# Patient Record
Sex: MALE | Race: BLACK OR AFRICAN AMERICAN | Employment: UNEMPLOYED | URBAN - METROPOLITAN AREA
[De-identification: names, ages, dates, MRNs, and addresses within clinical notes are randomized per-mention and may not be internally consistent; named-entity substitution may affect disease eponyms.]

---

## 2019-01-01 ENCOUNTER — OFFICE VISIT (OUTPATIENT)
Dept: FAMILY MEDICINE CLINIC | Age: 49
End: 2019-01-01

## 2019-01-01 VITALS
OXYGEN SATURATION: 93 % | WEIGHT: 206 LBS | HEIGHT: 66 IN | RESPIRATION RATE: 24 BRPM | DIASTOLIC BLOOD PRESSURE: 57 MMHG | TEMPERATURE: 98.9 F | SYSTOLIC BLOOD PRESSURE: 89 MMHG | BODY MASS INDEX: 33.11 KG/M2 | HEART RATE: 101 BPM

## 2019-01-01 VITALS
HEART RATE: 98 BPM | WEIGHT: 202.4 LBS | SYSTOLIC BLOOD PRESSURE: 154 MMHG | HEIGHT: 66 IN | DIASTOLIC BLOOD PRESSURE: 98 MMHG | BODY MASS INDEX: 32.53 KG/M2 | TEMPERATURE: 97.6 F | RESPIRATION RATE: 40 BRPM

## 2019-01-01 DIAGNOSIS — Z79.4 TYPE 2 DIABETES MELLITUS WITH HYPERGLYCEMIA, WITH LONG-TERM CURRENT USE OF INSULIN (HCC): ICD-10-CM

## 2019-01-01 DIAGNOSIS — B18.2 HEP C W/O COMA, CHRONIC (HCC): ICD-10-CM

## 2019-01-01 DIAGNOSIS — K70.30 ALCOHOLIC CIRRHOSIS OF LIVER WITHOUT ASCITES (HCC): ICD-10-CM

## 2019-01-01 DIAGNOSIS — R10.30 LOWER ABDOMINAL PAIN: ICD-10-CM

## 2019-01-01 DIAGNOSIS — R06.82 TACHYPNEA: ICD-10-CM

## 2019-01-01 DIAGNOSIS — I95.89 OTHER SPECIFIED HYPOTENSION: Primary | ICD-10-CM

## 2019-01-01 DIAGNOSIS — Z79.4 TYPE 2 DIABETES MELLITUS WITH HYPERGLYCEMIA, WITH LONG-TERM CURRENT USE OF INSULIN (HCC): Primary | ICD-10-CM

## 2019-01-01 DIAGNOSIS — E11.65 TYPE 2 DIABETES MELLITUS WITH HYPERGLYCEMIA, WITH LONG-TERM CURRENT USE OF INSULIN (HCC): Primary | ICD-10-CM

## 2019-01-01 DIAGNOSIS — E11.65 TYPE 2 DIABETES MELLITUS WITH HYPERGLYCEMIA, WITH LONG-TERM CURRENT USE OF INSULIN (HCC): ICD-10-CM

## 2019-01-01 DIAGNOSIS — K76.82 HEPATIC ENCEPHALOPATHY: ICD-10-CM

## 2019-01-01 LAB — GLUCOSE POC: 425 MG/DL

## 2019-01-01 RX ORDER — NADOLOL 20 MG/1
20 TABLET ORAL
COMMUNITY
Start: 2019-01-01 | End: 2020-01-01 | Stop reason: SDUPTHER

## 2019-01-01 RX ORDER — ISOSORBIDE DINITRATE 10 MG/1
20 TABLET ORAL 3 TIMES DAILY
COMMUNITY
Start: 2019-01-01 | End: 2020-01-01

## 2019-05-25 ENCOUNTER — APPOINTMENT (OUTPATIENT)
Dept: CT IMAGING | Age: 49
DRG: 280 | End: 2019-05-25
Attending: EMERGENCY MEDICINE
Payer: MEDICAID

## 2019-05-25 ENCOUNTER — HOSPITAL ENCOUNTER (INPATIENT)
Age: 49
LOS: 3 days | Discharge: PSYCHIATRIC OR SUBSTANCE USE FACILITY | DRG: 280 | End: 2019-05-29
Attending: EMERGENCY MEDICINE | Admitting: INTERNAL MEDICINE
Payer: MEDICAID

## 2019-05-25 ENCOUNTER — APPOINTMENT (OUTPATIENT)
Dept: GENERAL RADIOLOGY | Age: 49
DRG: 280 | End: 2019-05-25
Attending: EMERGENCY MEDICINE
Payer: MEDICAID

## 2019-05-25 DIAGNOSIS — F10.239 ALCOHOL DEPENDENCE WITH WITHDRAWAL WITH COMPLICATION (HCC): Chronic | ICD-10-CM

## 2019-05-25 DIAGNOSIS — B18.2 CHRONIC HEPATITIS C WITH HEPATIC COMA (HCC): ICD-10-CM

## 2019-05-25 DIAGNOSIS — K76.82 ACUTE HEPATIC ENCEPHALOPATHY: ICD-10-CM

## 2019-05-25 DIAGNOSIS — R76.8 HCV ANTIBODY POSITIVE: ICD-10-CM

## 2019-05-25 DIAGNOSIS — F10.10 ALCOHOL ABUSE: ICD-10-CM

## 2019-05-25 DIAGNOSIS — K76.82 HEPATIC ENCEPHALOPATHY: Primary | ICD-10-CM

## 2019-05-25 DIAGNOSIS — D69.6 THROMBOCYTOPENIA (HCC): ICD-10-CM

## 2019-05-25 DIAGNOSIS — F10.921 ALCOHOL INTOXICATION WITH DELIRIUM (HCC): ICD-10-CM

## 2019-05-25 DIAGNOSIS — D64.9 ANEMIA, UNSPECIFIED TYPE: ICD-10-CM

## 2019-05-25 DIAGNOSIS — F19.10 POLYSUBSTANCE ABUSE (HCC): ICD-10-CM

## 2019-05-25 DIAGNOSIS — K70.30 ALCOHOLIC CIRRHOSIS OF LIVER WITHOUT ASCITES (HCC): ICD-10-CM

## 2019-05-25 PROBLEM — R41.89 UNRESPONSIVENESS: Status: ACTIVE | Noted: 2019-05-25

## 2019-05-25 LAB
ALBUMIN SERPL-MCNC: 3 G/DL (ref 3.5–5)
ALBUMIN/GLOB SERPL: 0.6 {RATIO} (ref 1.1–2.2)
ALP SERPL-CCNC: 119 U/L (ref 45–117)
ALT SERPL-CCNC: 40 U/L (ref 12–78)
AMMONIA PLAS-SCNC: 81 UMOL/L
AMPHET UR QL SCN: NEGATIVE
ANION GAP SERPL CALC-SCNC: 9 MMOL/L (ref 5–15)
APPEARANCE UR: CLEAR
ARTERIAL PATENCY WRIST A: ABNORMAL
AST SERPL-CCNC: 36 U/L (ref 15–37)
BACTERIA URNS QL MICRO: NEGATIVE /HPF
BARBITURATES UR QL SCN: NEGATIVE
BASE DEFICIT BLDV-SCNC: 5 MMOL/L
BASOPHILS # BLD: 0 K/UL (ref 0–0.1)
BASOPHILS NFR BLD: 1 % (ref 0–1)
BDY SITE: ABNORMAL
BENZODIAZ UR QL: NEGATIVE
BILIRUB SERPL-MCNC: 0.7 MG/DL (ref 0.2–1)
BILIRUB UR QL: NEGATIVE
BUN SERPL-MCNC: 12 MG/DL (ref 6–20)
BUN/CREAT SERPL: 11 (ref 12–20)
CALCIUM SERPL-MCNC: 9.8 MG/DL (ref 8.5–10.1)
CANNABINOIDS UR QL SCN: NEGATIVE
CHLORIDE SERPL-SCNC: 107 MMOL/L (ref 97–108)
CO2 SERPL-SCNC: 24 MMOL/L (ref 21–32)
COCAINE UR QL SCN: POSITIVE
COLOR UR: ABNORMAL
COMMENT, HOLDF: NORMAL
CREAT SERPL-MCNC: 1.11 MG/DL (ref 0.7–1.3)
DIFFERENTIAL METHOD BLD: ABNORMAL
DRUG SCRN COMMENT,DRGCM: ABNORMAL
EOSINOPHIL # BLD: 0.3 K/UL (ref 0–0.4)
EOSINOPHIL NFR BLD: 6 % (ref 0–7)
EPITH CASTS URNS QL MICRO: ABNORMAL /LPF
ERYTHROCYTE [DISTWIDTH] IN BLOOD BY AUTOMATED COUNT: 17.4 % (ref 11.5–14.5)
ETHANOL SERPL-MCNC: 189 MG/DL
GAS FLOW.O2 O2 DELIVERY SYS: ABNORMAL L/MIN
GLOBULIN SER CALC-MCNC: 5.2 G/DL (ref 2–4)
GLUCOSE BLD STRIP.AUTO-MCNC: 390 MG/DL (ref 65–100)
GLUCOSE SERPL-MCNC: 385 MG/DL (ref 65–100)
GLUCOSE UR STRIP.AUTO-MCNC: >1000 MG/DL
HCO3 BLDV-SCNC: 21.4 MMOL/L (ref 23–28)
HCT VFR BLD AUTO: 35.9 % (ref 36.6–50.3)
HGB BLD-MCNC: 10.4 G/DL (ref 12.1–17)
HGB UR QL STRIP: ABNORMAL
IMM GRANULOCYTES # BLD AUTO: 0 K/UL (ref 0–0.04)
IMM GRANULOCYTES NFR BLD AUTO: 1 % (ref 0–0.5)
INR PPP: 1.2 (ref 0.9–1.1)
KETONES UR QL STRIP.AUTO: NEGATIVE MG/DL
LEUKOCYTE ESTERASE UR QL STRIP.AUTO: NEGATIVE
LIPASE SERPL-CCNC: 68 U/L (ref 73–393)
LYMPHOCYTES # BLD: 1.2 K/UL (ref 0.8–3.5)
LYMPHOCYTES NFR BLD: 22 % (ref 12–49)
MCH RBC QN AUTO: 26.1 PG (ref 26–34)
MCHC RBC AUTO-ENTMCNC: 29 G/DL (ref 30–36.5)
MCV RBC AUTO: 90 FL (ref 80–99)
METHADONE UR QL: NEGATIVE
MONOCYTES # BLD: 0.8 K/UL (ref 0–1)
MONOCYTES NFR BLD: 15 % (ref 5–13)
NEUTS SEG # BLD: 3.2 K/UL (ref 1.8–8)
NEUTS SEG NFR BLD: 55 % (ref 32–75)
NITRITE UR QL STRIP.AUTO: NEGATIVE
NRBC # BLD: 0 K/UL (ref 0–0.01)
NRBC BLD-RTO: 0 PER 100 WBC
OPIATES UR QL: POSITIVE
PCO2 BLDV: 40.8 MMHG (ref 41–51)
PCP UR QL: NEGATIVE
PH BLDV: 7.33 [PH] (ref 7.32–7.42)
PH UR STRIP: 5 [PH] (ref 5–8)
PLATELET # BLD AUTO: 121 K/UL (ref 150–400)
PMV BLD AUTO: 9.9 FL (ref 8.9–12.9)
PO2 BLDV: 47 MMHG (ref 25–40)
POTASSIUM SERPL-SCNC: 3.6 MMOL/L (ref 3.5–5.1)
PROT SERPL-MCNC: 8.2 G/DL (ref 6.4–8.2)
PROT UR STRIP-MCNC: 30 MG/DL
PROTHROMBIN TIME: 11.6 SEC (ref 9–11.1)
RBC # BLD AUTO: 3.99 M/UL (ref 4.1–5.7)
RBC #/AREA URNS HPF: ABNORMAL /HPF (ref 0–5)
SAMPLES BEING HELD,HOLD: NORMAL
SAO2 % BLDV: 79 % (ref 65–88)
SERVICE CMNT-IMP: ABNORMAL
SODIUM SERPL-SCNC: 140 MMOL/L (ref 136–145)
SP GR UR REFRACTOMETRY: 1.02 (ref 1–1.03)
SPECIMEN TYPE: ABNORMAL
TOTAL RESP. RATE, ITRR: 27
UR CULT HOLD, URHOLD: NORMAL
UROBILINOGEN UR QL STRIP.AUTO: 1 EU/DL (ref 0.2–1)
WBC # BLD AUTO: 5.7 K/UL (ref 4.1–11.1)
WBC URNS QL MICRO: ABNORMAL /HPF (ref 0–4)

## 2019-05-25 PROCEDURE — 36415 COLL VENOUS BLD VENIPUNCTURE: CPT

## 2019-05-25 PROCEDURE — 99285 EMERGENCY DEPT VISIT HI MDM: CPT

## 2019-05-25 PROCEDURE — 80053 COMPREHEN METABOLIC PANEL: CPT

## 2019-05-25 PROCEDURE — 83690 ASSAY OF LIPASE: CPT

## 2019-05-25 PROCEDURE — 82140 ASSAY OF AMMONIA: CPT

## 2019-05-25 PROCEDURE — 85025 COMPLETE CBC W/AUTO DIFF WBC: CPT

## 2019-05-25 PROCEDURE — 82962 GLUCOSE BLOOD TEST: CPT

## 2019-05-25 PROCEDURE — 83036 HEMOGLOBIN GLYCOSYLATED A1C: CPT

## 2019-05-25 PROCEDURE — 70450 CT HEAD/BRAIN W/O DYE: CPT

## 2019-05-25 PROCEDURE — 71045 X-RAY EXAM CHEST 1 VIEW: CPT

## 2019-05-25 PROCEDURE — 74011250636 HC RX REV CODE- 250/636: Performed by: EMERGENCY MEDICINE

## 2019-05-25 PROCEDURE — 82803 BLOOD GASES ANY COMBINATION: CPT

## 2019-05-25 PROCEDURE — 96360 HYDRATION IV INFUSION INIT: CPT

## 2019-05-25 PROCEDURE — 85610 PROTHROMBIN TIME: CPT

## 2019-05-25 PROCEDURE — 80307 DRUG TEST PRSMV CHEM ANLYZR: CPT

## 2019-05-25 PROCEDURE — 81001 URINALYSIS AUTO W/SCOPE: CPT

## 2019-05-25 PROCEDURE — 99218 HC RM OBSERVATION: CPT

## 2019-05-25 RX ORDER — ENOXAPARIN SODIUM 100 MG/ML
40 INJECTION SUBCUTANEOUS EVERY 24 HOURS
Status: DISCONTINUED | OUTPATIENT
Start: 2019-05-26 | End: 2019-05-28

## 2019-05-25 RX ORDER — SODIUM CHLORIDE 0.9 % (FLUSH) 0.9 %
5-40 SYRINGE (ML) INJECTION EVERY 8 HOURS
Status: DISCONTINUED | OUTPATIENT
Start: 2019-05-25 | End: 2019-05-29 | Stop reason: HOSPADM

## 2019-05-25 RX ORDER — ACETAMINOPHEN 325 MG/1
650 TABLET ORAL
Status: DISCONTINUED | OUTPATIENT
Start: 2019-05-25 | End: 2019-05-29 | Stop reason: HOSPADM

## 2019-05-25 RX ORDER — ONDANSETRON 2 MG/ML
4 INJECTION INTRAMUSCULAR; INTRAVENOUS
Status: DISCONTINUED | OUTPATIENT
Start: 2019-05-25 | End: 2019-05-29 | Stop reason: HOSPADM

## 2019-05-25 RX ORDER — SODIUM CHLORIDE 0.9 % (FLUSH) 0.9 %
5-40 SYRINGE (ML) INJECTION AS NEEDED
Status: DISCONTINUED | OUTPATIENT
Start: 2019-05-25 | End: 2019-05-29 | Stop reason: HOSPADM

## 2019-05-25 RX ADMIN — SODIUM CHLORIDE 1000 ML: 900 INJECTION, SOLUTION INTRAVENOUS at 19:18
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
## 2019-05-25 NOTE — ED NOTES
Bedside and Verbal shift change report given to [address] (oncoming nurse) by Shannon Ruiz (offgoing nurse). Report included the following information SBAR, Kardex, ED Summary, [facility] and Recent Results.

## 2019-05-25 NOTE — ED TRIAGE NOTES
Patient presents from home with complaints of hyperglycemia. Patient's blood sugar en route was 393. Patient reports \"ain't nothing wrong\" . Patient denies SI and HI at this time. When asked patient reports he drank \"a little bit\" of alcohol today

## 2019-05-26 ENCOUNTER — APPOINTMENT (OUTPATIENT)
Dept: CT IMAGING | Age: 49
DRG: 280 | End: 2019-05-26
Attending: INTERNAL MEDICINE
Payer: MEDICAID

## 2019-05-26 PROBLEM — K76.82 HEPATIC ENCEPHALOPATHY: Status: ACTIVE | Noted: 2019-05-26

## 2019-05-26 PROBLEM — K76.82 ACUTE HEPATIC ENCEPHALOPATHY: Status: ACTIVE | Noted: 2019-05-26

## 2019-05-26 PROBLEM — K70.30 LIVER CIRRHOSIS, ALCOHOLIC (HCC): Status: ACTIVE | Noted: 2019-05-26

## 2019-05-26 PROBLEM — D69.6 THROMBOCYTOPENIA (HCC): Status: ACTIVE | Noted: 2019-05-26

## 2019-05-26 PROBLEM — E72.20 HYPERAMMONEMIA (HCC): Status: ACTIVE | Noted: 2019-05-26

## 2019-05-26 LAB
EST. AVERAGE GLUCOSE BLD GHB EST-MCNC: 269 MG/DL
GLUCOSE BLD STRIP.AUTO-MCNC: 250 MG/DL (ref 65–100)
GLUCOSE BLD STRIP.AUTO-MCNC: 284 MG/DL (ref 65–100)
GLUCOSE BLD STRIP.AUTO-MCNC: 296 MG/DL (ref 65–100)
GLUCOSE BLD STRIP.AUTO-MCNC: 322 MG/DL (ref 65–100)
GLUCOSE BLD STRIP.AUTO-MCNC: 338 MG/DL (ref 65–100)
HBA1C MFR BLD: 11 % (ref 4.2–6.3)
SERVICE CMNT-IMP: ABNORMAL

## 2019-05-26 PROCEDURE — 74011250636 HC RX REV CODE- 250/636: Performed by: INTERNAL MEDICINE

## 2019-05-26 PROCEDURE — 82962 GLUCOSE BLOOD TEST: CPT

## 2019-05-26 PROCEDURE — 74011250636 HC RX REV CODE- 250/636: Performed by: EMERGENCY MEDICINE

## 2019-05-26 PROCEDURE — 65660000001 HC RM ICU INTERMED STEPDOWN

## 2019-05-26 PROCEDURE — 74011636637 HC RX REV CODE- 636/637: Performed by: INTERNAL MEDICINE

## 2019-05-26 PROCEDURE — 99218 HC RM OBSERVATION: CPT

## 2019-05-26 PROCEDURE — 74011250637 HC RX REV CODE- 250/637: Performed by: INTERNAL MEDICINE

## 2019-05-26 PROCEDURE — 96361 HYDRATE IV INFUSION ADD-ON: CPT

## 2019-05-26 PROCEDURE — 74011636320 HC RX REV CODE- 636/320: Performed by: RADIOLOGY

## 2019-05-26 PROCEDURE — 74177 CT ABD & PELVIS W/CONTRAST: CPT

## 2019-05-26 PROCEDURE — 74011000250 HC RX REV CODE- 250: Performed by: INTERNAL MEDICINE

## 2019-05-26 PROCEDURE — 74011000258 HC RX REV CODE- 258: Performed by: RADIOLOGY

## 2019-05-26 RX ORDER — SODIUM CHLORIDE 0.9 % (FLUSH) 0.9 %
10 SYRINGE (ML) INJECTION
Status: DISPENSED | OUTPATIENT
Start: 2019-05-26 | End: 2019-05-26

## 2019-05-26 RX ORDER — CLONIDINE HYDROCHLORIDE 0.3 MG/1
0.3 TABLET ORAL 2 TIMES DAILY
Status: ON HOLD | COMMUNITY
End: 2019-06-03 | Stop reason: SDUPTHER

## 2019-05-26 RX ORDER — LORAZEPAM 2 MG/ML
4 INJECTION INTRAMUSCULAR
Status: DISCONTINUED | OUTPATIENT
Start: 2019-05-26 | End: 2019-05-29 | Stop reason: HOSPADM

## 2019-05-26 RX ORDER — CLONIDINE 0.1 MG/24H
1 PATCH, EXTENDED RELEASE TRANSDERMAL ONCE
Status: DISCONTINUED | OUTPATIENT
Start: 2019-05-26 | End: 2019-05-29 | Stop reason: HOSPADM

## 2019-05-26 RX ORDER — DEXTROSE 50 % IN WATER (D50W) INTRAVENOUS SYRINGE
25-50 AS NEEDED
Status: DISCONTINUED | OUTPATIENT
Start: 2019-05-26 | End: 2019-05-29 | Stop reason: HOSPADM

## 2019-05-26 RX ORDER — INSULIN LISPRO 100 [IU]/ML
INJECTION, SOLUTION INTRAVENOUS; SUBCUTANEOUS
Status: DISCONTINUED | OUTPATIENT
Start: 2019-05-26 | End: 2019-05-28

## 2019-05-26 RX ORDER — LORAZEPAM 2 MG/ML
2 INJECTION INTRAMUSCULAR
Status: DISCONTINUED | OUTPATIENT
Start: 2019-05-26 | End: 2019-05-29 | Stop reason: HOSPADM

## 2019-05-26 RX ORDER — LORAZEPAM 0.5 MG/1
0.5 TABLET ORAL EVERY 4 HOURS
Status: DISCONTINUED | OUTPATIENT
Start: 2019-05-26 | End: 2019-05-26

## 2019-05-26 RX ORDER — MAGNESIUM SULFATE 100 %
4 CRYSTALS MISCELLANEOUS AS NEEDED
Status: DISCONTINUED | OUTPATIENT
Start: 2019-05-26 | End: 2019-05-29 | Stop reason: HOSPADM

## 2019-05-26 RX ORDER — HYDRALAZINE HYDROCHLORIDE 20 MG/ML
20 INJECTION INTRAMUSCULAR; INTRAVENOUS
Status: DISCONTINUED | OUTPATIENT
Start: 2019-05-26 | End: 2019-05-29 | Stop reason: HOSPADM

## 2019-05-26 RX ORDER — SODIUM CHLORIDE 0.9 % (FLUSH) 0.9 %
10 SYRINGE (ML) INJECTION
Status: COMPLETED | OUTPATIENT
Start: 2019-05-26 | End: 2019-05-26

## 2019-05-26 RX ORDER — HYDRALAZINE HYDROCHLORIDE 20 MG/ML
20 INJECTION INTRAMUSCULAR; INTRAVENOUS
Status: DISCONTINUED | OUTPATIENT
Start: 2019-05-26 | End: 2019-05-26

## 2019-05-26 RX ORDER — TRAMADOL HYDROCHLORIDE 50 MG/1
50 TABLET ORAL
Status: DISCONTINUED | OUTPATIENT
Start: 2019-05-26 | End: 2019-05-29

## 2019-05-26 RX ORDER — FOLIC ACID 1 MG/1
1 TABLET ORAL DAILY
Status: DISCONTINUED | OUTPATIENT
Start: 2019-05-26 | End: 2019-05-29 | Stop reason: SDUPTHER

## 2019-05-26 RX ORDER — LORAZEPAM 0.5 MG/1
0.5 TABLET ORAL EVERY 6 HOURS
Status: DISPENSED | OUTPATIENT
Start: 2019-05-27 | End: 2019-05-27

## 2019-05-26 RX ORDER — CLONIDINE HYDROCHLORIDE 0.1 MG/1
0.1 TABLET ORAL
Status: DISCONTINUED | OUTPATIENT
Start: 2019-05-26 | End: 2019-05-29 | Stop reason: HOSPADM

## 2019-05-26 RX ADMIN — HYDRALAZINE HYDROCHLORIDE 20 MG: 20 INJECTION INTRAMUSCULAR; INTRAVENOUS at 15:25

## 2019-05-26 RX ADMIN — Medication 10 ML: at 04:54

## 2019-05-26 RX ADMIN — INSULIN LISPRO 5 UNITS: 100 INJECTION, SOLUTION INTRAVENOUS; SUBCUTANEOUS at 17:13

## 2019-05-26 RX ADMIN — IOPAMIDOL 100 ML: 755 INJECTION, SOLUTION INTRAVENOUS at 08:00

## 2019-05-26 RX ADMIN — HYDRALAZINE HYDROCHLORIDE 20 MG: 20 INJECTION INTRAMUSCULAR; INTRAVENOUS at 23:48

## 2019-05-26 RX ADMIN — INSULIN LISPRO 7 UNITS: 100 INJECTION, SOLUTION INTRAVENOUS; SUBCUTANEOUS at 07:05

## 2019-05-26 RX ADMIN — SODIUM CHLORIDE 100 ML: 900 INJECTION, SOLUTION INTRAVENOUS at 08:00

## 2019-05-26 RX ADMIN — FOLIC ACID: 5 INJECTION, SOLUTION INTRAMUSCULAR; INTRAVENOUS; SUBCUTANEOUS at 09:43

## 2019-05-26 RX ADMIN — LACTULOSE 15 ML: 20 SOLUTION ORAL at 15:25

## 2019-05-26 RX ADMIN — LORAZEPAM 0.5 MG: 0.5 TABLET ORAL at 15:25

## 2019-05-26 RX ADMIN — HYDRALAZINE HYDROCHLORIDE 20 MG: 20 INJECTION INTRAMUSCULAR; INTRAVENOUS at 19:03

## 2019-05-26 RX ADMIN — LORAZEPAM 0.5 MG: 0.5 TABLET ORAL at 12:05

## 2019-05-26 RX ADMIN — LACTULOSE 15 ML: 20 SOLUTION ORAL at 07:06

## 2019-05-26 RX ADMIN — ONDANSETRON 4 MG: 2 INJECTION INTRAMUSCULAR; INTRAVENOUS at 15:31

## 2019-05-26 RX ADMIN — LORAZEPAM 4 MG: 2 INJECTION, SOLUTION INTRAMUSCULAR; INTRAVENOUS at 20:37

## 2019-05-26 RX ADMIN — Medication 10 ML: at 05:00

## 2019-05-26 RX ADMIN — FOLIC ACID 1 MG: 1 TABLET ORAL at 09:43

## 2019-05-26 RX ADMIN — CLONIDINE HYDROCHLORIDE 0.3 MG: 0.1 TABLET ORAL at 18:15

## 2019-05-26 RX ADMIN — LORAZEPAM 4 MG: 2 INJECTION, SOLUTION INTRAMUSCULAR; INTRAVENOUS at 19:31

## 2019-05-26 RX ADMIN — SODIUM CHLORIDE 1000 ML: 900 INJECTION, SOLUTION INTRAVENOUS at 00:18

## 2019-05-26 RX ADMIN — INSULIN LISPRO 3 UNITS: 100 INJECTION, SOLUTION INTRAVENOUS; SUBCUTANEOUS at 12:05

## 2019-05-26 RX ADMIN — ENOXAPARIN SODIUM 40 MG: 40 INJECTION SUBCUTANEOUS at 04:53

## 2019-05-26 RX ADMIN — LORAZEPAM 4 MG: 2 INJECTION, SOLUTION INTRAMUSCULAR; INTRAVENOUS at 18:41

## 2019-05-26 RX ADMIN — LORAZEPAM 0.5 MG: 0.5 TABLET ORAL at 04:53

## 2019-05-26 RX ADMIN — TRAMADOL HYDROCHLORIDE 50 MG: 50 TABLET, FILM COATED ORAL at 15:57

## 2019-05-26 RX ADMIN — ENOXAPARIN SODIUM 40 MG: 40 INJECTION SUBCUTANEOUS at 23:01

## 2019-05-26 RX ADMIN — Medication 10 ML: at 08:00

## 2019-05-26 RX ADMIN — LORAZEPAM 0.5 MG: 0.5 TABLET ORAL at 07:05

## 2019-05-26 RX ADMIN — LORAZEPAM 2 MG: 2 INJECTION INTRAMUSCULAR; INTRAVENOUS at 15:57

## 2019-05-26 RX ADMIN — LORAZEPAM 4 MG: 2 INJECTION, SOLUTION INTRAMUSCULAR; INTRAVENOUS at 22:27

## 2019-05-26 RX ADMIN — Medication 10 ML: at 22:00

## 2019-05-26 RX ADMIN — LORAZEPAM 2 MG: 2 INJECTION INTRAMUSCULAR; INTRAVENOUS at 17:17
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
## 2019-05-26 NOTE — PROGRESS NOTES
Bedside shift change report given to [address]Celina Tan (oncoming nurse) by Ashok Cutler (offgoing nurse). Report included the following information SBAR, Kardex and MAR.
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
## 2019-05-26 NOTE — ACP (ADVANCE CARE PLANNING)
Advance Care Planning Note Name: Alicia Zuluaga YOB: 1970 MRN: [id number] Admission Date: 5/25/2019  6:58 PM 
 
Date of discussion: 5/26/2019 Active Diagnoses: 
 
Hospital Problems  Date Reviewed: 5/26/2019 Codes Class Noted POA Hepatic encephalopathy ([facility] ICD-10-CM: K72.90 ICD-9-CM: 572.2  5/26/2019 Yes Hyperammonemia (HCC) ICD-10-CM: P16.49 ICD-9-CM: 270.6  5/26/2019 Yes Thrombocytopenia ([facility] ICD-10-CM: D69.6 ICD-9-CM: 287.5  5/26/2019 Yes Liver cirrhosis, alcoholic ([facility] EBE-86-ER: K70.30 ICD-9-CM: 571.2  5/26/2019 Yes Unresponsiveness ICD-10-CM: R41.89 ICD-9-CM: 780.09  5/25/2019 Unknown These active diagnoses are of sufficient risk that focused discussion on advance care planning is indicated in order to allow the patient to thoughtfully consider personal goals of care, and if situations arise that prevent the ability to personally give input, to ensure appropriate representation of their personal desires for different levels and aggressiveness of care. Discussion:  
 
Persons present and participating in discussion: Alicia Margareth, and Antoinette Warren MD. Discussion: Addressed decompensated medical problems, Co-morbidities, Advance directives, Prognosis and confirmation of a Surrogate Decision Maker. Time Spent:  
 
Total time spent face-to-face in education and discussion: 16 minutes.   
 
Antoinette Warren MD 
5/26/2019 
1:32 PM

## 2019-05-26 NOTE — ED NOTES
Patient has returned from CT scan at this time. Patient has shifted himself in bed and answered orientation questions before falling back to sleep. CM x 3. Call bell within reach of patient.

## 2019-05-26 NOTE — PROGRESS NOTES
Bedside shift change report given to Judy Schroeder (oncoming nurse) by Nesha Jensen (offgoing nurse). Report included the following information SBAR.

## 2019-05-26 NOTE — PROGRESS NOTES
Spoke with dr regarding pts /98 and current CIWA( 15) score. Nurse gave prescribed PRN ativan and will continue to monitor.

## 2019-05-26 NOTE — ROUTINE PROCESS
TRANSFER - OUT REPORT: 
 
Verbal report given to Andi Kee RN(name) on Екатерина Dominique  being transferred to (unit) for routine progression of care Report consisted of patients Situation, Background, Assessment and  
Recommendations(SBAR). Information from the following report(s) SBAR, ED Summary and Intake/Output was reviewed with the receiving nurse. Lines:  
Peripheral IV 05/25/19 Left Forearm (Active) Site Assessment Clean, dry, & intact 5/25/2019  7:17 PM  
Phlebitis Assessment 0 5/25/2019  7:17 PM  
Infiltration Assessment 0 5/25/2019  7:17 PM  
Dressing Status Clean, dry, & intact 5/25/2019  7:17 PM  
  
 
Opportunity for questions and clarification was provided. Patient transported with: 
 Monitor Registered Nurse

## 2019-05-26 NOTE — PROGRESS NOTES
Care Management - Met with patient in the room. He said his address is now [address] [place] [address], [place] [address]. His phone number is [phone]. Patient stated he had Medicaid. Patient stated he will use a Medicaid cab to go home. Observation notice provided in writing to patient and/or caregiver as well as verbal explanation of the policy. Patients who are in outpatient status also receive the Observation notice. Care Management Interventions Mode of Transport at Discharge: (medicaid cab) Discharge Durable Medical Equipment: No 
Physical Therapy Consult: No 
Occupational Therapy Consult: No 
Speech Therapy Consult: No 
Confirm Follow Up Transport: Self(Medicaid cab) Plan discussed with Pt/Family/Caregiver: Yes [employer] Information Provided?: No 
Discharge Location Discharge Placement: Home Confirmed via Medicaid Portal that patient has Medicaid Expansion. His number is [id number].  
 
IRASEMA Morgan

## 2019-05-26 NOTE — PROGRESS NOTES
TRANSFER - OUT REPORT: 
 
Verbal report given to Davian Brand (name) on Екатерина Dominique  being transferred to [facility] (unit) for change in patient condition(increased confusion, high MEWS, and High CIWA) Report consisted of patients Situation, Background, Assessment and  
Recommendations(SBAR). Information from the following report(s) SBAR, Kardex and Intake/Output was reviewed with the receiving nurse. Lines:  
Peripheral IV 05/25/19 Left Forearm (Active) Site Assessment Clean, dry, & intact 5/26/2019  1:30 PM  
Phlebitis Assessment 0 5/26/2019  1:30 PM  
Infiltration Assessment 0 5/26/2019  1:30 PM  
Dressing Status Clean, dry, & intact 5/26/2019  1:30 PM  
Dressing Type Transparent 5/26/2019  1:30 PM  
Hub Color/Line Status Patent; Flushed 5/26/2019  1:30 PM  
Action Taken Open ports on tubing capped 5/26/2019  1:30 PM  
Alcohol Cap Used Yes 5/26/2019  1:30 PM  
  
 
Opportunity for questions and clarification was provided. Patient transported with: 
 Nurse

## 2019-05-26 NOTE — PROGRESS NOTES
Patient re-evaluated. Noted with increased confusion, agitation, high MUSE and CIWA scores. Will transfer to [facility] for higher level of care. D/W patient and nursing.

## 2019-05-26 NOTE — PROGRESS NOTES
1839: Pt received, pt unable to stand, bed pt was received in was not zeroed prior to receiving pt. Pt declines answering admission questions at this time. Pt received with no PIV, PIV placed. Dr. Jose Enrique Bragg paged, /111. , orders received. Will continue to monitor. 2010: Dr. Francesco Rocha paged and notified of /86, RR 41, , CIWA 18 and 2X dose of 4mg of Ativan given last 2 hours per CIWA. Orders received to allow Ativan some time to take effect and if anything changes to notify MD. Will continue to monitor. Bedside shift change report given to [address] (oncoming nurse) by Fátima Nobles RN (offgoing nurse). Report included the following information SBAR, Kardex, ED Summary, Procedure Summary, Intake/Output, MAR, Recent Results, Med Rec Status, Cardiac Rhythm Sinus Tach, Alarm Parameters  and Quality Measures.

## 2019-05-26 NOTE — PROGRESS NOTES
Hospitalist Progress Note Jonathan Zayas MD 
     
                             Answering service: [phone] OR [phone] from in house phone Date of Service:  2019 NAME:  Cynthia Cheng :  1970 MRN:  [id number] Admission Summary:  
 
45 Y/o gentleman with a PMH significant for Alcohol Use Disorder, Liver Cirrhosis, Hepatitis C, Bipolar 1 Disorder, and Primary Hypertension was brought to the ER via EMS after being found slumped over in the stairwell at an extended stay hotel. Patient was a poor historian, unable to give any pertinent history. Reason for follow up:  
   
CC: AMS Patient appeared confused and tremulous on early am rounds. Was unable to give all pertinent history. Assessment & Plan:  
 
1) CNS: Acute on probable chronic Hepatic encephalopathy due to Liver Cirrhosis with Hyperammonemia present on admission. CT head without contrast negative for any acute findings. Continue with scheduled lactulose and neurochecks. 2) GI: Alcoholic Liver Cirrhosis with mild coagulopathy. Hepatitis C. CT Abdomen and pelvis with some Splenomegaly and portal hypertension. GI consult. 3) CVS: Uncontrolled Primary Hypertension. 2D ECHO to evaluate for hypertensive heart disease. 4) Endocrine: Uncontrolled Type 2 DM with complications including Hyperglycemia. A1C of 11 Accucheck monitoring, sliding scale insulin, Diabetic teaching. 5) Hematology: Thrombocytopenia probably due to the Alcoholic Liver Disease. No bleeding diatheses. 5) Nutrition: Daily multivitamin/mineral, Thiamine and Folic Acid supplements. 6) Social: Alcohol Use Disorder. Cessation counselling done. [facility] protocol. 7) Plan: Anticipate D/C to home in 3-5 days. D/W patient. Hospital Problems  Date Reviewed: 2013 Codes Class Noted POA Unresponsiveness ICD-10-CM: R41.89 ICD-9-CM: 780.09  5/25/2019 Unknown Physical Examination:  
 
 Last 24hrs VS reviewed since prior progress note. Most recent are: 
Visit Vitals BP (!) 177/113 (BP 1 Location: Right arm, BP Patient Position: At rest) Pulse (!) 103 Temp 98.8 °F (37.1 °C) Resp 18 SpO2 98% Constitutional:  No acute distress, cooperative, pleasant   
HEENT: Head is a traumatic, Un icteric sclera. Pink conjunctiva,no erythema or discharge. Oral mucous moist, oropharynx benign. Neck supple, Resp:  CTA bilaterally. No wheezing/rhonchi/rales. No accessory muscle use CV:  Regular rhythm, normal rate, no murmurs, gallops, rubs GI:  Distended, non tender. normoactive bowel sounds, no hepatosplenomegaly :  No CVA or suprapubic tenderness Skin  :  No erythema,rash,bullae,dipigmentation Musculoskeletal:  No edema, warm, 2+ pulses throughout Neurologic:  Alert and awake. Positive asterixis. Intake/Output Summary (Last 24 hours) at 5/26/2019 1259 Last data filed at 5/26/2019 1235 Gross per 24 hour Intake  Output 800 ml Net -800 ml Labs:  
 
Recent Labs  
  05/25/19 1918 WBC 5.7 HGB 10.4* HCT 35.9*  
* Recent Labs  
  05/25/19 1918   
K 3.6  CO2 24 BUN 12  
CREA 1.11  
* CA 9.8 Recent Labs  
  05/25/19 1918 SGOT 36 ALT 40 * TBILI 0.7 TP 8.2 ALB 3.0*  
GLOB 5.2*  
LPSE 68* Recent Labs  
  05/25/19 1918 INR 1.2* PTP 11.6* No results for input(s): FE, TIBC, PSAT, FERR in the last 72 hours. Lab Results Component Value Date/Time Folate >24.0 (H) 08/09/2013 04:30 PM  
  
No results for input(s): PH, PCO2, PO2 in the last 72 hours. No results for input(s): CPK, CKNDX, TROIQ in the last 72 hours. No lab exists for component: CPKMB No results found for: CHOL, CHOLX, CHLST, CHOLV, HDL, LDL, LDLC, DLDLP, TGLX, TRIGL, TRIGP, CHHD, CHHDX Lab Results Component Value Date/Time Glucose (POC) 250 (H) 05/26/2019 11:14 AM  
 Glucose (POC) 322 (H) 05/26/2019 06:26 AM  
 Glucose (POC) 296 (H) 05/26/2019 02:16 AM  
 Glucose (POC) 338 (H) 05/26/2019 12:15 AM  
 Glucose (POC) 390 (H) 05/25/2019 07:15 PM  
 
Lab Results Component Value Date/Time Color YELLOW/STRAW 05/25/2019 08:16 PM  
 Appearance CLEAR 05/25/2019 08:16 PM  
 Specific gravity 1.025 05/25/2019 08:16 PM  
 pH (UA) 5.0 05/25/2019 08:16 PM  
 Protein 30 (A) 05/25/2019 08:16 PM  
 Glucose >1,000 (A) 05/25/2019 08:16 PM  
 Ketone NEGATIVE  05/25/2019 08:16 PM  
 Bilirubin NEGATIVE  05/25/2019 08:16 PM  
 Urobilinogen 1.0 05/25/2019 08:16 PM  
 Nitrites NEGATIVE  05/25/2019 08:16 PM  
 Leukocyte Esterase NEGATIVE  05/25/2019 08:16 PM  
 Epithelial cells FEW 05/25/2019 08:16 PM  
 Bacteria NEGATIVE  05/25/2019 08:16 PM  
 WBC 0-4 05/25/2019 08:16 PM  
 RBC 10-20 05/25/2019 08:16 PM  
 
 
 
Medications Reviewed:  
 
Current Facility-Administered Medications Medication Dose Route Frequency  folic acid (FOLVITE) tablet 1 mg  1 mg Oral DAILY  lactulose (CHRONULAC) 10 gram/15 mL solution 15 mL  10 g Oral BID  
 glucose chewable tablet 16 g  4 Tab Oral PRN  
 dextrose (D50W) injection syrg 12.5-25 g  25-50 mL IntraVENous PRN  
 glucagon (GLUCAGEN) injection 1 mg  1 mg IntraMUSCular PRN  
 insulin lispro (HUMALOG) injection   SubCUTAneous TIDAC  
 LORazepam (ATIVAN) tablet 0.5 mg  0.5 mg Oral Q4H  
 [START ON 5/27/2019] LORazepam (ATIVAN) tablet 0.5 mg  0.5 mg Oral Q6H  
 0.9% sodium chloride 7,639 mL with folic acid 1 mg, thiamine 100 mg, mvi, adult no. 4 with vit K 10 mL infusion   IntraVENous DAILY  LORazepam (ATIVAN) injection 2 mg  2 mg IntraVENous Q1H PRN  
 sodium chloride 0.9 % bolus infusion 100 mL  100 mL IntraVENous RAD ONCE  
 iopamidol (ISOVUE-370) 76 % injection 100 mL  100 mL IntraVENous RAD ONCE  
 sodium chloride (NS) flush 10 mL  10 mL IntraVENous RAD ONCE  
  sodium chloride (NS) flush 10 mL  10 mL IntraVENous RAD ONCE  
 iopamidol (ISOVUE-370) 76 % injection 100 mL  100 mL IntraVENous RAD ONCE  
 sodium chloride 0.9 % bolus infusion 100 mL  100 mL IntraVENous RAD ONCE  
 sodium chloride (NS) flush 5-40 mL  5-40 mL IntraVENous Q8H  
 sodium chloride (NS) flush 5-40 mL  5-40 mL IntraVENous PRN  
 enoxaparin (LOVENOX) injection 40 mg  40 mg SubCUTAneous Q24H  
 acetaminophen (TYLENOL) tablet 650 mg  650 mg Oral Q4H PRN  
 ondansetron (ZOFRAN) injection 4 mg  4 mg IntraVENous Q4H PRN  
 
______________________________________________________________________ EXPECTED LENGTH OF STAY: - - - 
ACTUAL LENGTH OF STAY:          0 bK Longoria MD

## 2019-05-26 NOTE — PROGRESS NOTES
Patient's BP is 203/113. MEWS score is  A 5. Spoke with Dr and dr aware. Nurse will administer medication and continue to assess.

## 2019-05-27 LAB
ALBUMIN SERPL-MCNC: 2.7 G/DL (ref 3.5–5)
ALBUMIN/GLOB SERPL: 0.5 {RATIO} (ref 1.1–2.2)
ALP SERPL-CCNC: 118 U/L (ref 45–117)
ALT SERPL-CCNC: 34 U/L (ref 12–78)
AMMONIA PLAS-SCNC: 60 UMOL/L
ANION GAP SERPL CALC-SCNC: 9 MMOL/L (ref 5–15)
AST SERPL-CCNC: 31 U/L (ref 15–37)
BASOPHILS # BLD: 0 K/UL (ref 0–0.1)
BASOPHILS NFR BLD: 0 % (ref 0–1)
BILIRUB SERPL-MCNC: 1.5 MG/DL (ref 0.2–1)
BUN SERPL-MCNC: 11 MG/DL (ref 6–20)
BUN/CREAT SERPL: 10 (ref 12–20)
CALCIUM SERPL-MCNC: 9.2 MG/DL (ref 8.5–10.1)
CHLORIDE SERPL-SCNC: 104 MMOL/L (ref 97–108)
CO2 SERPL-SCNC: 23 MMOL/L (ref 21–32)
CREAT SERPL-MCNC: 1.08 MG/DL (ref 0.7–1.3)
DIFFERENTIAL METHOD BLD: ABNORMAL
EOSINOPHIL # BLD: 0 K/UL (ref 0–0.4)
EOSINOPHIL NFR BLD: 0 % (ref 0–7)
ERYTHROCYTE [DISTWIDTH] IN BLOOD BY AUTOMATED COUNT: 17.2 % (ref 11.5–14.5)
GLOBULIN SER CALC-MCNC: 5 G/DL (ref 2–4)
GLUCOSE BLD STRIP.AUTO-MCNC: 214 MG/DL (ref 65–100)
GLUCOSE BLD STRIP.AUTO-MCNC: 282 MG/DL (ref 65–100)
GLUCOSE BLD STRIP.AUTO-MCNC: 315 MG/DL (ref 65–100)
GLUCOSE BLD STRIP.AUTO-MCNC: 318 MG/DL (ref 65–100)
GLUCOSE SERPL-MCNC: 382 MG/DL (ref 65–100)
HCT VFR BLD AUTO: 36 % (ref 36.6–50.3)
HGB BLD-MCNC: 10.8 G/DL (ref 12.1–17)
IMM GRANULOCYTES # BLD AUTO: 0 K/UL (ref 0–0.04)
IMM GRANULOCYTES NFR BLD AUTO: 0 % (ref 0–0.5)
LYMPHOCYTES # BLD: 0.9 K/UL (ref 0.8–3.5)
LYMPHOCYTES NFR BLD: 11 % (ref 12–49)
MAGNESIUM SERPL-MCNC: 1.5 MG/DL (ref 1.6–2.4)
MCH RBC QN AUTO: 26.1 PG (ref 26–34)
MCHC RBC AUTO-ENTMCNC: 30 G/DL (ref 30–36.5)
MCV RBC AUTO: 87 FL (ref 80–99)
MONOCYTES # BLD: 0.9 K/UL (ref 0–1)
MONOCYTES NFR BLD: 12 % (ref 5–13)
NEUTS SEG # BLD: 6.1 K/UL (ref 1.8–8)
NEUTS SEG NFR BLD: 77 % (ref 32–75)
NRBC # BLD: 0 K/UL (ref 0–0.01)
NRBC BLD-RTO: 0 PER 100 WBC
PLATELET # BLD AUTO: 146 K/UL (ref 150–400)
PMV BLD AUTO: 9.7 FL (ref 8.9–12.9)
POTASSIUM SERPL-SCNC: 3.1 MMOL/L (ref 3.5–5.1)
PROT SERPL-MCNC: 7.7 G/DL (ref 6.4–8.2)
RBC # BLD AUTO: 4.14 M/UL (ref 4.1–5.7)
SERVICE CMNT-IMP: ABNORMAL
SODIUM SERPL-SCNC: 136 MMOL/L (ref 136–145)
WBC # BLD AUTO: 8 K/UL (ref 4.1–11.1)

## 2019-05-27 PROCEDURE — 82962 GLUCOSE BLOOD TEST: CPT

## 2019-05-27 PROCEDURE — 74011250637 HC RX REV CODE- 250/637: Performed by: INTERNAL MEDICINE

## 2019-05-27 PROCEDURE — 83735 ASSAY OF MAGNESIUM: CPT

## 2019-05-27 PROCEDURE — 65660000001 HC RM ICU INTERMED STEPDOWN

## 2019-05-27 PROCEDURE — 74011250636 HC RX REV CODE- 250/636: Performed by: INTERNAL MEDICINE

## 2019-05-27 PROCEDURE — 82140 ASSAY OF AMMONIA: CPT

## 2019-05-27 PROCEDURE — 36415 COLL VENOUS BLD VENIPUNCTURE: CPT

## 2019-05-27 PROCEDURE — 74011636637 HC RX REV CODE- 636/637: Performed by: INTERNAL MEDICINE

## 2019-05-27 PROCEDURE — 74011000250 HC RX REV CODE- 250: Performed by: INTERNAL MEDICINE

## 2019-05-27 PROCEDURE — 85025 COMPLETE CBC W/AUTO DIFF WBC: CPT

## 2019-05-27 PROCEDURE — 80053 COMPREHEN METABOLIC PANEL: CPT

## 2019-05-27 RX ORDER — POTASSIUM CHLORIDE 750 MG/1
40 TABLET, FILM COATED, EXTENDED RELEASE ORAL
Status: COMPLETED | OUTPATIENT
Start: 2019-05-27 | End: 2019-05-27

## 2019-05-27 RX ORDER — MAGNESIUM SULFATE HEPTAHYDRATE 40 MG/ML
2 INJECTION, SOLUTION INTRAVENOUS ONCE
Status: COMPLETED | OUTPATIENT
Start: 2019-05-27 | End: 2019-05-27

## 2019-05-27 RX ORDER — INSULIN GLARGINE 100 [IU]/ML
16 INJECTION, SOLUTION SUBCUTANEOUS DAILY
Status: DISCONTINUED | OUTPATIENT
Start: 2019-05-28 | End: 2019-05-29

## 2019-05-27 RX ADMIN — Medication 10 ML: at 06:00

## 2019-05-27 RX ADMIN — LORAZEPAM 2 MG: 2 INJECTION INTRAMUSCULAR; INTRAVENOUS at 17:57

## 2019-05-27 RX ADMIN — LORAZEPAM 0.5 MG: 0.5 TABLET ORAL at 11:08

## 2019-05-27 RX ADMIN — INSULIN LISPRO 3 UNITS: 100 INJECTION, SOLUTION INTRAVENOUS; SUBCUTANEOUS at 11:07

## 2019-05-27 RX ADMIN — CLONIDINE HYDROCHLORIDE 0.3 MG: 0.1 TABLET ORAL at 08:25

## 2019-05-27 RX ADMIN — INSULIN LISPRO 7 UNITS: 100 INJECTION, SOLUTION INTRAVENOUS; SUBCUTANEOUS at 06:37

## 2019-05-27 RX ADMIN — Medication 10 ML: at 14:00

## 2019-05-27 RX ADMIN — POTASSIUM CHLORIDE 40 MEQ: 750 TABLET, EXTENDED RELEASE ORAL at 11:08

## 2019-05-27 RX ADMIN — LACTULOSE 15 ML: 20 SOLUTION ORAL at 17:04

## 2019-05-27 RX ADMIN — LORAZEPAM 0.5 MG: 0.5 TABLET ORAL at 17:03

## 2019-05-27 RX ADMIN — Medication 10 ML: at 21:25

## 2019-05-27 RX ADMIN — LACTULOSE 15 ML: 20 SOLUTION ORAL at 08:26

## 2019-05-27 RX ADMIN — INSULIN LISPRO 5 UNITS: 100 INJECTION, SOLUTION INTRAVENOUS; SUBCUTANEOUS at 17:01

## 2019-05-27 RX ADMIN — MAGNESIUM SULFATE HEPTAHYDRATE 2 G: 40 INJECTION, SOLUTION INTRAVENOUS at 10:08

## 2019-05-27 RX ADMIN — FOLIC ACID: 5 INJECTION, SOLUTION INTRAMUSCULAR; INTRAVENOUS; SUBCUTANEOUS at 08:14

## 2019-05-27 RX ADMIN — ENOXAPARIN SODIUM 40 MG: 40 INJECTION SUBCUTANEOUS at 23:58

## 2019-05-27 RX ADMIN — ONDANSETRON 4 MG: 2 INJECTION INTRAMUSCULAR; INTRAVENOUS at 23:58

## 2019-05-27 RX ADMIN — FOLIC ACID 1 MG: 1 TABLET ORAL at 08:25

## 2019-05-27 RX ADMIN — CLONIDINE HYDROCHLORIDE 0.3 MG: 0.1 TABLET ORAL at 17:03

## 2019-05-27 NOTE — CONSULTS
[address]  [id number] Name:  Nik Boyle 
MR#:  [id number] :  1970 ACCOUNT #:  [de-identified] DATE OF SERVICE:  2019 CHIEF COMPLAINT:  ETOH withdrawal, alcohol, polysubstance abuse, history of chronic hepatitis C, bipolar disorder. HISTORY OF PRESENT ILLNESS:  The patient apparently was brought to the emergency room after being slumped over in a stairwell in an extended-stay hotel and was brought in by ambulance. The patient currently can provide no history as he is totally sedated for his alcohol withdrawal and lies in [facility]. The patient has already been moved from the regular hospital bed to a monitored one. The patient apparently was complaining of some lower abdominal pain in the emergency room where he was more coherent. He denied any nausea, vomiting, diarrhea. The patient has a longstanding history of alcoholic pancreatitis and has had history of multiple admissions for polysubstance abuse, overdose, and suicide attempts. It is unclear whether or not he has been treated. PAST MEDICAL HISTORY:  Positive for bipolar disorder, diabetes, chronic hepatitis C, hypertension, alcoholic and HCV cirrhosis, depression, suicide attempts. MEDICATIONS ON ADMISSION:  Include Percocet, tramadol, Folvite. Reported history of metoclopramide usage. PAST SURGICAL HISTORY:  Unclear as the patient can provide no history. This is not reliable. Cholecystectomy. SOCIAL HISTORY:  Daily smoker, ETOH abuse, heroin abuse, and narcotic use. REVIEW OF SYSTEMS:  Unavailable. PHYSICAL EXAMINATION: 
GENERAL:  The patient is lethargic. He is arousable barely with touching his chest. 
VITAL SIGNS:  Temperature is 98.4, pulse is 124, blood pressure is 181/100, respirations are 28. HEAD, EYES, EARS, NOSE, AND THROAT:  Sclerae are anicteric. Conjunctivae are pink. Moist mucous membranes. NECK:  Supple. LYMPHATIC:  No adenopathy in the neck or groin. CHEST:  Clear to auscultation and percussion. HEART:  Reveals a tachycardia. ABDOMEN:  Soft. There is a hard liver 4-5 cm below the right costal margin. Spleen tip not definitely felt. EXTREMITIES:  Without cyanosis, clubbing, or edema. SKIN:  Warm and dry. No definite petechiae. LABORATORY DATA:  WBC is 5.7, hemoglobin is 10.4, normal indices, platelet count is 493,180. Urinalysis reveals 10-20 rbc's. The PT/INR is 1.2. CMP reveals glucose of 385, calcium 9.8, total protein is 8.2, albumin is 3.0, globulins are 5.2. AST and ALT are normal.  Bilirubin is normal.  Alkaline phosphatase is minimally elevated. Lipase is normal at 68. Hemoglobin A1c is 11. Ammonia level is 81. Urine drug screen is positive for cocaine and opiates. Serum alcohol level is 189 mg/dL. CT scan of the abdomen and pelvis demonstrated no acute findings, cirrhosis with portal venous hypertension with numerous varicosities and borderline splenomegaly. Gallbladder is absent. There is no ascites. There is an atherosclerotic aorta, nonobstructive nephrolithiasis, and umbilical/ventral hernias. IMPRESSION: 
1. Alcoholic and hepatitis C cirrhosis with portal hypertension. 2.  Alcohol abuse and withdrawal. 
3.  Hepatic encephalopathy. Agree with start of lactulose. 4.  Poorly controlled hyperglycemia. PLAN:  Will need much support and supervision over the next 24-48 hours. Agree with CIWA protocol. We will follow with you while in the hospital.  While in the hospital, suggest Hepatology consultation. MD TARA Guevara/[email or web address] 
D:  05/26/2019 22:36 
T:  05/27/2019 1:22 
JOB #:  [id number] CC:  MD Meagan Wallace MD

## 2019-05-27 NOTE — PROGRESS NOTES
Hospitalist Progress Note Antoinette Warren MD 
     
                             Answering service: [phone] OR [phone] from in house phone Date of Service:  2019 NAME:  Alicia Zuluaga :  1970 MRN:  [id number] Admission Summary:  
 
43 Y/o gentleman with a PMH significant for Alcohol Use Disorder, Liver Cirrhosis, Hepatitis C, Bipolar 1 Disorder, and Primary Hypertension was brought to the ER via EMS after being found slumped over in the stairwell at an extended stay hotel. Patient was a poor historian, unable to give any pertinent history. Reason for follow up:  
   
CC: AMS All events in the past 24 hours reviewed in their entirety. Patient was sleepy but arousable on early am rounds. Per sitter, was very agitated overnight. Assessment & Plan:  
 
1) CNS: Acute on probable chronic Hepatic encephalopathy due to Liver Cirrhosis with Hyperammonemia present on admission. CT head without contrast negative for any acute findings. Continue with scheduled lactulose and neurochecks. 2) GI: Alcoholic Liver Cirrhosis with mild coagulopathy. Hepatitis C. CT Abdomen and pelvis with some Splenomegaly and portal hypertension. GI eval noted and appreciated. For Hepatology consult. 3) CVS: Uncontrolled Primary Hypertension. 2D ECHO to evaluate LV function. 4) Endocrine: Uncontrolled Type 2 DM with complications including Hyperglycemia. A1C of 11 Accucheck monitoring, sliding scale insulin, Diabetic teaching. Diabetes Educator consult. 5) Hematology: Thrombocytopenia probably due to the Alcoholic Liver Disease. No bleeding diatheses. 5) Nutrition: Daily multivitamin/mineral, Thiamine and Folic Acid supplements. 6) Social: Alcohol Use Disorder. Cessation counselling done. [facility] protocol. 7) Plan: Anticipate D/C to home in 3-5 days. D/W patient. Hospital Problems  Date Reviewed: 5/26/2019 Codes Class Noted POA Hepatic encephalopathy ([facility] ICD-10-CM: K72.90 ICD-9-CM: 572.2  5/26/2019 Yes Hyperammonemia (HCC) ICD-10-CM: Q34.77 ICD-9-CM: 270.6  5/26/2019 Yes Thrombocytopenia ([facility] ICD-10-CM: D69.6 ICD-9-CM: 287.5  5/26/2019 Yes Liver cirrhosis, alcoholic ([facility] TQF-43-TS: K70.30 ICD-9-CM: 571.2  5/26/2019 Yes Acute hepatic encephalopathy ICD-10-CM: K72.00 ICD-9-CM: 572.2  5/26/2019 Unknown Unresponsiveness ICD-10-CM: R41.89 ICD-9-CM: 780.09  5/25/2019 Unknown Physical Examination:  
 
 Last 24hrs VS reviewed since prior progress note. Most recent are: 
Visit Vitals BP 94/72 Pulse 94 Temp 98.7 °F (37.1 °C) Resp 14 Wt 86 kg (189 lb 9.6 oz) SpO2 96% Constitutional:  No acute distress, cooperative, pleasant   
HEENT: Head is a traumatic, Un icteric sclera. Pink conjunctiva,no erythema or discharge. Oral mucous moist, oropharynx benign. Neck supple, Resp:  CTA bilaterally. No wheezing/rhonchi/rales. No accessory muscle use CV:  Regular rhythm, normal rate, no murmurs, gallops, rubs GI:  Distended, non tender. normoactive bowel sounds, no hepatosplenomegaly appreciated. :  No CVA or suprapubic tenderness Skin  :  No erythema,rash,bullae,dipigmentation Musculoskeletal:  No edema, warm, 2+ pulses throughout Neurologic:  Arousable. Positive asterixis. Intake/Output Summary (Last 24 hours) at 5/27/2019 1105 Last data filed at 5/27/2019 7497 Gross per 24 hour Intake 1254.17 ml Output 675 ml Net 579.17 ml Labs:  
 
Recent Labs  
  05/27/19 
0110 05/25/19 
1918 WBC 8.0 5.7 HGB 10.8* 10.4* HCT 36.0* 35.9*  
* 121* Recent Labs  
  05/27/19 
0110 05/25/19 1918  140  
K 3.1* 3.6  107 CO2 23 24 BUN 11 12 CREA 1.08 1.11  
* 385* CA 9.2 9.8 MG 1.5*  --   
 
Recent Labs  
  05/27/19 
0110 05/25/19 1918 SGOT 31 36 ALT 34 40 * 119* TBILI 1.5* 0.7 TP 7.7 8.2 ALB 2.7* 3.0*  
GLOB 5.0* 5.2*  
LPSE  --  68* Recent Labs  
  05/25/19 1918 INR 1.2* PTP 11.6* No results for input(s): FE, TIBC, PSAT, FERR in the last 72 hours. Lab Results Component Value Date/Time Folate >24.0 (H) 08/09/2013 04:30 PM  
  
No results for input(s): PH, PCO2, PO2 in the last 72 hours. No results for input(s): CPK, CKNDX, TROIQ in the last 72 hours. No lab exists for component: CPKMB No results found for: CHOL, CHOLX, CHLST, CHOLV, HDL, LDL, LDLC, DLDLP, TGLX, TRIGL, TRIGP, CHHD, CHHDX Lab Results Component Value Date/Time Glucose (POC) 214 (H) 05/27/2019 11:00 AM  
 Glucose (POC) 318 (H) 05/27/2019 06:20 AM  
 Glucose (POC) 284 (H) 05/26/2019 04:53 PM  
 Glucose (POC) 250 (H) 05/26/2019 11:14 AM  
 Glucose (POC) 322 (H) 05/26/2019 06:26 AM  
 
Lab Results Component Value Date/Time Color YELLOW/STRAW 05/25/2019 08:16 PM  
 Appearance CLEAR 05/25/2019 08:16 PM  
 Specific gravity 1.025 05/25/2019 08:16 PM  
 pH (UA) 5.0 05/25/2019 08:16 PM  
 Protein 30 (A) 05/25/2019 08:16 PM  
 Glucose >1,000 (A) 05/25/2019 08:16 PM  
 Ketone NEGATIVE  05/25/2019 08:16 PM  
 Bilirubin NEGATIVE  05/25/2019 08:16 PM  
 Urobilinogen 1.0 05/25/2019 08:16 PM  
 Nitrites NEGATIVE  05/25/2019 08:16 PM  
 Leukocyte Esterase NEGATIVE  05/25/2019 08:16 PM  
 Epithelial cells FEW 05/25/2019 08:16 PM  
 Bacteria NEGATIVE  05/25/2019 08:16 PM  
 WBC 0-4 05/25/2019 08:16 PM  
 RBC 10-20 05/25/2019 08:16 PM  
 
 
 
Medications Reviewed:  
 
Current Facility-Administered Medications Medication Dose Route Frequency  magnesium sulfate 2 g/50 ml IVPB (premix or compounded)  2 g IntraVENous ONCE  potassium chloride SR (KLOR-CON 10) tablet 40 mEq  40 mEq Oral NOW  folic acid (FOLVITE) tablet 1 mg  1 mg Oral DAILY  lactulose (CHRONULAC) 10 gram/15 mL solution 15 mL  10 g Oral BID  
 glucose chewable tablet 16 g  4 Tab Oral PRN  
 dextrose (D50W) injection syrg 12.5-25 g  25-50 mL IntraVENous PRN  
 glucagon (GLUCAGEN) injection 1 mg  1 mg IntraMUSCular PRN  
 insulin lispro (HUMALOG) injection   SubCUTAneous TIDAC  
 LORazepam (ATIVAN) tablet 0.5 mg  0.5 mg Oral Q6H  
 0.9% sodium chloride 4,512 mL with folic acid 1 mg, thiamine 100 mg, mvi, adult no. 4 with vit K 10 mL infusion   IntraVENous DAILY  LORazepam (ATIVAN) injection 2 mg  2 mg IntraVENous Q1H PRN  
 cloNIDine HCl (CATAPRES) tablet 0.1 mg  0.1 mg Oral Q6H PRN  
 cloNIDine HCl (CATAPRES) tablet 0.3 mg  0.3 mg Oral BID  traMADol (ULTRAM) tablet 50 mg  50 mg Oral Q6H PRN  
 LORazepam (ATIVAN) injection 4 mg  4 mg IntraVENous Q1H PRN  
 hydrALAZINE (APRESOLINE) 20 mg/mL injection 20 mg  20 mg IntraVENous Q4H PRN  
 cloNIDine (CATAPRES) 0.1 mg/24 hr patch 1 Patch  1 Patch TransDERmal ONCE  
 sodium chloride (NS) flush 5-40 mL  5-40 mL IntraVENous Q8H  
 sodium chloride (NS) flush 5-40 mL  5-40 mL IntraVENous PRN  
 enoxaparin (LOVENOX) injection 40 mg  40 mg SubCUTAneous Q24H  
 acetaminophen (TYLENOL) tablet 650 mg  650 mg Oral Q4H PRN  
 ondansetron (ZOFRAN) injection 4 mg  4 mg IntraVENous Q4H PRN  
 
______________________________________________________________________ EXPECTED LENGTH OF STAY: - - - 
ACTUAL LENGTH OF STAY:          1 Shagufta White MD

## 2019-05-27 NOTE — DIABETES MGMT
DTC Consult Note Recommendations/ Comments: Chart reviewed on Cynthia Cheng for hospital glucose management/ hyperglycemia. If appropriate, please consider: 
 - addition of Lantus 16 units - may be able to use orals once patient more stable, but can not use Metformin due to cirrhosis Message sent to Dr. Juliette Loo regarding above DTC attempted to see patient for elevated A1C, however, nurse stated that patient is currently withdrawing from alcohol and is requiring a one to one sitte. DTC will see paient once appropriate. Current hospital DM medication: Lispro correction, normal sensitivity Patient is a 44 y.o. male with known diabetes, no home medications listed on PTA. A1c:  
Lab Results Component Value Date/Time Hemoglobin A1c 11.0 (H) 05/25/2019 07:20 PM  
 Hemoglobin A1c 6.4 (H) 08/05/2013 06:00 AM  
 
 
Recent Glucose Results:  
Lab Results Component Value Date/Time  (H) 05/27/2019 01:10 AM  
 GLUCPOC 214 (H) 05/27/2019 11:00 AM  
 GLUCPOC 318 (H) 05/27/2019 06:20 AM  
 GLUCPOC 284 (H) 05/26/2019 04:53 PM  
  
 
Lab Results Component Value Date/Time Creatinine 1.08 05/27/2019 01:10 AM  
 
CrCl cannot be calculated (Unknown ideal weight. ). Active Orders Diet DIET DIABETIC CONSISTENT CARB Regular PO intake:  
Patient Vitals for the past 72 hrs: 
 % Diet Eaten 05/27/19 1348 90 % Will continue to follow as needed. Thank you Christen Macias MS, RN, CDE Time spent: 8 minutes

## 2019-05-27 NOTE — PROGRESS NOTES
Problem: Diabetes Self-Management Goal: *Disease process and treatment process Description Define diabetes and identify own type of diabetes; list 3 options for treating diabetes. Outcome: Progressing Towards Goal 
Goal: *Monitoring blood glucose, interpreting and using results Description Identify recommended blood glucose targets  and personal targets. Outcome: Progressing Towards Goal 
  
Problem: Falls - Risk of 
Goal: *Absence of Falls Description Document Marshall Guerrero Fall Risk and appropriate interventions in the flowsheet. Outcome: Progressing Towards Goal 
  
Problem: Pressure Injury - Risk of 
Goal: *Prevention of pressure injury Description Document Anuj Scale and appropriate interventions in the flowsheet. Outcome: Progressing Towards Goal 
  
0245: Called lift team for jeff lift to get a weight Bedside and Verbal shift change report given to Mago Villasenor (oncoming nurse) by Germania Schaefer RN (offgoing nurse). Report included the following information SBAR, Kardex, Intake/Output, MAR and Recent Results.

## 2019-05-27 NOTE — PROGRESS NOTES
Problem: Alcohol Withdrawal 
Goal: *STG: Vital signs within defined limits Outcome: Progressing Towards Goal 
Visit Vitals /70 (BP 1 Location: Right arm, BP Patient Position: At rest) Pulse 92 Temp 98.7 °F (37.1 °C) Resp 14 Wt 86 kg (189 lb 9.6 oz) SpO2 96% Goal: Interventions Outcome: Progressing Towards Goal 
Pt received ativan last on 5/26 at 2227. CIWA assessed frequently and will administer ativan according to order. Pt currently sleeping with sitter at bedside. Will continue to monitor. Patient stated that his last drink was on the morning of 5/25/19. 
 
1940: Bedside shift change report given to Waleska Garcia RN (oncoming nurse) by Lisa Rosales RN (offgoing nurse). Report included the following information SBAR, Kardex, MAR, Accordion, Recent Results and Cardiac Rhythm NSR.

## 2019-05-28 LAB
ALBUMIN SERPL-MCNC: 2.5 G/DL (ref 3.5–5)
ALBUMIN/GLOB SERPL: 0.5 {RATIO} (ref 1.1–2.2)
ALP SERPL-CCNC: 106 U/L (ref 45–117)
ALT SERPL-CCNC: 27 U/L (ref 12–78)
AMMONIA PLAS-SCNC: 61 UMOL/L
ANION GAP SERPL CALC-SCNC: 8 MMOL/L (ref 5–15)
AST SERPL-CCNC: 25 U/L (ref 15–37)
BASOPHILS # BLD: 0 K/UL (ref 0–0.1)
BASOPHILS NFR BLD: 1 % (ref 0–1)
BILIRUB SERPL-MCNC: 0.8 MG/DL (ref 0.2–1)
BUN SERPL-MCNC: 20 MG/DL (ref 6–20)
BUN/CREAT SERPL: 20 (ref 12–20)
CALCIUM SERPL-MCNC: 9.1 MG/DL (ref 8.5–10.1)
CHLORIDE SERPL-SCNC: 107 MMOL/L (ref 97–108)
CO2 SERPL-SCNC: 23 MMOL/L (ref 21–32)
CREAT SERPL-MCNC: 1.01 MG/DL (ref 0.7–1.3)
DIFFERENTIAL METHOD BLD: ABNORMAL
EOSINOPHIL # BLD: 0.1 K/UL (ref 0–0.4)
EOSINOPHIL NFR BLD: 1 % (ref 0–7)
ERYTHROCYTE [DISTWIDTH] IN BLOOD BY AUTOMATED COUNT: 17 % (ref 11.5–14.5)
GLOBULIN SER CALC-MCNC: 4.7 G/DL (ref 2–4)
GLUCOSE BLD STRIP.AUTO-MCNC: 253 MG/DL (ref 65–100)
GLUCOSE BLD STRIP.AUTO-MCNC: 325 MG/DL (ref 65–100)
GLUCOSE BLD STRIP.AUTO-MCNC: 350 MG/DL (ref 65–100)
GLUCOSE BLD STRIP.AUTO-MCNC: 383 MG/DL (ref 65–100)
GLUCOSE SERPL-MCNC: 294 MG/DL (ref 65–100)
HCT VFR BLD AUTO: 34.8 % (ref 36.6–50.3)
HGB BLD-MCNC: 10.4 G/DL (ref 12.1–17)
IMM GRANULOCYTES # BLD AUTO: 0 K/UL (ref 0–0.04)
IMM GRANULOCYTES NFR BLD AUTO: 0 % (ref 0–0.5)
LYMPHOCYTES # BLD: 0.8 K/UL (ref 0.8–3.5)
LYMPHOCYTES NFR BLD: 20 % (ref 12–49)
MAGNESIUM SERPL-MCNC: 1.7 MG/DL (ref 1.6–2.4)
MCH RBC QN AUTO: 26.3 PG (ref 26–34)
MCHC RBC AUTO-ENTMCNC: 29.9 G/DL (ref 30–36.5)
MCV RBC AUTO: 88.1 FL (ref 80–99)
MONOCYTES # BLD: 0.5 K/UL (ref 0–1)
MONOCYTES NFR BLD: 11 % (ref 5–13)
NEUTS SEG # BLD: 2.7 K/UL (ref 1.8–8)
NEUTS SEG NFR BLD: 67 % (ref 32–75)
NRBC # BLD: 0 K/UL (ref 0–0.01)
NRBC BLD-RTO: 0 PER 100 WBC
PHOSPHATE SERPL-MCNC: 2.8 MG/DL (ref 2.6–4.7)
PLATELET # BLD AUTO: 94 K/UL (ref 150–400)
PMV BLD AUTO: 10.2 FL (ref 8.9–12.9)
POTASSIUM SERPL-SCNC: 3.5 MMOL/L (ref 3.5–5.1)
PROT SERPL-MCNC: 7.2 G/DL (ref 6.4–8.2)
RBC # BLD AUTO: 3.95 M/UL (ref 4.1–5.7)
SERVICE CMNT-IMP: ABNORMAL
SODIUM SERPL-SCNC: 138 MMOL/L (ref 136–145)
WBC # BLD AUTO: 4 K/UL (ref 4.1–11.1)

## 2019-05-28 PROCEDURE — 85025 COMPLETE CBC W/AUTO DIFF WBC: CPT

## 2019-05-28 PROCEDURE — 74011250637 HC RX REV CODE- 250/637: Performed by: NURSE PRACTITIONER

## 2019-05-28 PROCEDURE — 94760 N-INVAS EAR/PLS OXIMETRY 1: CPT

## 2019-05-28 PROCEDURE — 74011636637 HC RX REV CODE- 636/637: Performed by: INTERNAL MEDICINE

## 2019-05-28 PROCEDURE — 82140 ASSAY OF AMMONIA: CPT

## 2019-05-28 PROCEDURE — 87902 NFCT AGT GNTYP ALYS HEP C: CPT

## 2019-05-28 PROCEDURE — 87522 HEPATITIS C REVRS TRNSCRPJ: CPT

## 2019-05-28 PROCEDURE — 74011636637 HC RX REV CODE- 636/637: Performed by: HOSPITALIST

## 2019-05-28 PROCEDURE — 82962 GLUCOSE BLOOD TEST: CPT

## 2019-05-28 PROCEDURE — 36415 COLL VENOUS BLD VENIPUNCTURE: CPT

## 2019-05-28 PROCEDURE — 74011250637 HC RX REV CODE- 250/637: Performed by: INTERNAL MEDICINE

## 2019-05-28 PROCEDURE — 84100 ASSAY OF PHOSPHORUS: CPT

## 2019-05-28 PROCEDURE — 74011250636 HC RX REV CODE- 250/636: Performed by: NURSE PRACTITIONER

## 2019-05-28 PROCEDURE — 83735 ASSAY OF MAGNESIUM: CPT

## 2019-05-28 PROCEDURE — 80053 COMPREHEN METABOLIC PANEL: CPT

## 2019-05-28 PROCEDURE — 74011250636 HC RX REV CODE- 250/636: Performed by: INTERNAL MEDICINE

## 2019-05-28 PROCEDURE — 74011000250 HC RX REV CODE- 250: Performed by: INTERNAL MEDICINE

## 2019-05-28 PROCEDURE — 65660000000 HC RM CCU STEPDOWN

## 2019-05-28 PROCEDURE — 86708 HEPATITIS A ANTIBODY: CPT

## 2019-05-28 RX ORDER — IBUPROFEN 600 MG/1
600 TABLET ORAL ONCE
Status: COMPLETED | OUTPATIENT
Start: 2019-05-28 | End: 2019-05-28

## 2019-05-28 RX ORDER — KETOROLAC TROMETHAMINE 30 MG/ML
15 INJECTION, SOLUTION INTRAMUSCULAR; INTRAVENOUS
Status: COMPLETED | OUTPATIENT
Start: 2019-05-28 | End: 2019-05-28

## 2019-05-28 RX ORDER — INSULIN LISPRO 100 [IU]/ML
INJECTION, SOLUTION INTRAVENOUS; SUBCUTANEOUS
Status: DISCONTINUED | OUTPATIENT
Start: 2019-05-28 | End: 2019-05-29 | Stop reason: HOSPADM

## 2019-05-28 RX ADMIN — IBUPROFEN 600 MG: 600 TABLET, FILM COATED ORAL at 22:02

## 2019-05-28 RX ADMIN — LACTULOSE 15 ML: 20 SOLUTION ORAL at 09:38

## 2019-05-28 RX ADMIN — LORAZEPAM 4 MG: 2 INJECTION, SOLUTION INTRAMUSCULAR; INTRAVENOUS at 05:46

## 2019-05-28 RX ADMIN — INSULIN LISPRO 7 UNITS: 100 INJECTION, SOLUTION INTRAVENOUS; SUBCUTANEOUS at 17:32

## 2019-05-28 RX ADMIN — CLONIDINE HYDROCHLORIDE 0.3 MG: 0.1 TABLET ORAL at 09:36

## 2019-05-28 RX ADMIN — FOLIC ACID: 5 INJECTION, SOLUTION INTRAMUSCULAR; INTRAVENOUS; SUBCUTANEOUS at 10:39

## 2019-05-28 RX ADMIN — TRAMADOL HYDROCHLORIDE 50 MG: 50 TABLET, FILM COATED ORAL at 00:09

## 2019-05-28 RX ADMIN — FOLIC ACID 1 MG: 1 TABLET ORAL at 09:37

## 2019-05-28 RX ADMIN — INSULIN LISPRO 4 UNITS: 100 INJECTION, SOLUTION INTRAVENOUS; SUBCUTANEOUS at 21:40

## 2019-05-28 RX ADMIN — LORAZEPAM 2 MG: 2 INJECTION INTRAMUSCULAR; INTRAVENOUS at 00:10

## 2019-05-28 RX ADMIN — LACTULOSE 15 ML: 20 SOLUTION ORAL at 17:31

## 2019-05-28 RX ADMIN — ONDANSETRON 4 MG: 2 INJECTION INTRAMUSCULAR; INTRAVENOUS at 05:46

## 2019-05-28 RX ADMIN — Medication 10 ML: at 21:39

## 2019-05-28 RX ADMIN — LORAZEPAM 4 MG: 2 INJECTION, SOLUTION INTRAMUSCULAR; INTRAVENOUS at 22:10

## 2019-05-28 RX ADMIN — CLONIDINE HYDROCHLORIDE 0.3 MG: 0.1 TABLET ORAL at 17:31

## 2019-05-28 RX ADMIN — KETOROLAC TROMETHAMINE 15 MG: 30 INJECTION, SOLUTION INTRAMUSCULAR; INTRAVENOUS at 05:46

## 2019-05-28 RX ADMIN — Medication 10 ML: at 14:00

## 2019-05-28 RX ADMIN — INSULIN LISPRO 10 UNITS: 100 INJECTION, SOLUTION INTRAVENOUS; SUBCUTANEOUS at 09:37

## 2019-05-28 RX ADMIN — INSULIN LISPRO 5 UNITS: 100 INJECTION, SOLUTION INTRAVENOUS; SUBCUTANEOUS at 11:30

## 2019-05-28 RX ADMIN — INSULIN GLARGINE 16 UNITS: 100 INJECTION, SOLUTION SUBCUTANEOUS at 09:38

## 2019-05-28 RX ADMIN — TRAMADOL HYDROCHLORIDE 50 MG: 50 TABLET, FILM COATED ORAL at 09:37

## 2019-05-28 RX ADMIN — Medication 10 ML: at 05:47

## 2019-05-28 RX ADMIN — LORAZEPAM 4 MG: 2 INJECTION, SOLUTION INTRAMUSCULAR; INTRAVENOUS at 02:34

## 2019-05-28 NOTE — PROGRESS NOTES
Isaak NP Progress note Name: Jose Luis Prajapati YOB: 1970 MRN: [id number] Admission Date: 5/25/2019  6:58 PM 
 
Date of service: 5/28/2019 3:14 AM 
 
Overnight Update:  
  
 
Complaint: Suicidal ideation Paged by: Adelita Beck Subjective: While drawing blood, patient expressed to RN desire to kill himself. No obvious signs of self harm Plan: - Psych consult placed - Sitter order placed Benjamin BRASHER-C, PAAydenC 
[phone] or Valentinanthony
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
## 2019-05-28 NOTE — PROGRESS NOTES
notes that patient was admitted over the weekend to our ED. He presently has a sitter and is sleeping.

## 2019-05-28 NOTE — DIABETES MGMT
DTC Consult Note Recommendations/ Comments: Chart reviewed on Leeanne Huynh for hospital glucose management/ hyperglycemia. If appropriate, please consider: 
 - addition of Lispro pre-meal, 4 units tid ac (concern for use of orals with liver disease) Message sent to Dr. Naomi Wagner regarding above DTC will see paient once appropriate. Current hospital DM medication: Lispro correction, normal sensitivity, Lantus 16 units Patient is a 44 y.o. male with known diabetes, no home medications listed on PTA. A1c:  
Lab Results Component Value Date/Time Hemoglobin A1c 11.0 (H) 05/25/2019 07:20 PM  
 Hemoglobin A1c 6.4 (H) 08/05/2013 06:00 AM  
 
 
Recent Glucose Results:  
Lab Results Component Value Date/Time  (H) 05/28/2019 03:04 AM  
 GLUCPOC 253 (H) 05/28/2019 11:40 AM  
 GLUCPOC 383 (H) 05/28/2019 06:48 AM  
 GLUCPOC 315 (H) 05/27/2019 09:15 PM  
  
 
Lab Results Component Value Date/Time Creatinine 1.01 05/28/2019 03:04 AM  
 
Estimated Creatinine Clearance: 87.8 mL/min (based on SCr of 1.01 mg/dL). Active Orders Diet DIET DIABETIC CONSISTENT CARB Regular PO intake:  
Patient Vitals for the past 72 hrs: 
 % Diet Eaten 05/28/19 0930 80 % 05/27/19 1348 90 % Will continue to follow as needed. Thank you Lana Wilson MS, RN, CDE Time spent: 8 minutes

## 2019-05-28 NOTE — PROGRESS NOTES
Spiritual Care Partner Volunteer visited patient in Rm 406 on 5/28/19. Documented by: Chaplain Koch MDiv, MACE 
[phone] [phone])
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
## 2019-05-28 NOTE — PROGRESS NOTES
Bedside shift change report given to Carolina Casillas RN (oncoming nurse) by Jesenia Chávez (offgoing nurse). Report included the following information SBAR, Kardex, Intake/Output and Recent Results.

## 2019-05-29 ENCOUNTER — HOSPITAL ENCOUNTER (INPATIENT)
Age: 49
LOS: 5 days | Discharge: HOME | DRG: 754 | End: 2019-06-03
Attending: PSYCHIATRY & NEUROLOGY | Admitting: PSYCHIATRY & NEUROLOGY
Payer: COMMERCIAL

## 2019-05-29 VITALS
BODY MASS INDEX: 29.97 KG/M2 | DIASTOLIC BLOOD PRESSURE: 106 MMHG | SYSTOLIC BLOOD PRESSURE: 165 MMHG | HEIGHT: 66 IN | HEART RATE: 85 BPM | TEMPERATURE: 98.6 F | RESPIRATION RATE: 18 BRPM | OXYGEN SATURATION: 95 % | WEIGHT: 186.51 LBS

## 2019-05-29 PROBLEM — F31.9 BIPOLAR 1 DISORDER, DEPRESSED (HCC): Status: ACTIVE | Noted: 2019-05-29

## 2019-05-29 LAB
GLUCOSE BLD STRIP.AUTO-MCNC: 205 MG/DL (ref 65–100)
GLUCOSE BLD STRIP.AUTO-MCNC: 272 MG/DL (ref 65–100)
GLUCOSE BLD STRIP.AUTO-MCNC: 312 MG/DL (ref 65–100)
GLUCOSE BLD STRIP.AUTO-MCNC: 359 MG/DL (ref 65–100)
HAV AB SER QL IA: POSITIVE
HCV RNA SERPL NAA+PROBE-ACNC: NORMAL IU/ML
SERIAL MONITORING: NORMAL
SERVICE CMNT-IMP: ABNORMAL

## 2019-05-29 PROCEDURE — 74011636637 HC RX REV CODE- 636/637: Performed by: HOSPITALIST

## 2019-05-29 PROCEDURE — 74011250637 HC RX REV CODE- 250/637: Performed by: HOSPITALIST

## 2019-05-29 PROCEDURE — 65220000003 HC RM SEMIPRIVATE PSYCH

## 2019-05-29 PROCEDURE — 74011000250 HC RX REV CODE- 250: Performed by: INTERNAL MEDICINE

## 2019-05-29 PROCEDURE — 74011250637 HC RX REV CODE- 250/637: Performed by: PSYCHIATRY & NEUROLOGY

## 2019-05-29 PROCEDURE — 82962 GLUCOSE BLOOD TEST: CPT

## 2019-05-29 PROCEDURE — 74011636637 HC RX REV CODE- 636/637: Performed by: INTERNAL MEDICINE

## 2019-05-29 PROCEDURE — 74011250636 HC RX REV CODE- 250/636: Performed by: INTERNAL MEDICINE

## 2019-05-29 PROCEDURE — 74011636637 HC RX REV CODE- 636/637: Performed by: NURSE PRACTITIONER

## 2019-05-29 PROCEDURE — 74011250637 HC RX REV CODE- 250/637: Performed by: INTERNAL MEDICINE

## 2019-05-29 RX ORDER — IBUPROFEN 400 MG/1
400 TABLET ORAL
Status: CANCELLED | OUTPATIENT
Start: 2019-05-29 | End: 2019-06-03

## 2019-05-29 RX ORDER — IBUPROFEN 400 MG/1
400 TABLET ORAL
Status: DISCONTINUED | OUTPATIENT
Start: 2019-05-29 | End: 2019-05-31

## 2019-05-29 RX ORDER — PANTOPRAZOLE SODIUM 40 MG/1
40 TABLET, DELAYED RELEASE ORAL DAILY
Status: CANCELLED | OUTPATIENT
Start: 2019-05-30

## 2019-05-29 RX ORDER — HYDROXYZINE 50 MG/1
50 TABLET, FILM COATED ORAL
Status: DISCONTINUED | OUTPATIENT
Start: 2019-05-29 | End: 2019-06-03

## 2019-05-29 RX ORDER — OLANZAPINE 5 MG/1
5 TABLET ORAL
Status: DISCONTINUED | OUTPATIENT
Start: 2019-05-29 | End: 2019-06-03 | Stop reason: HOSPADM

## 2019-05-29 RX ORDER — IBUPROFEN 200 MG
1 TABLET ORAL
Status: DISCONTINUED | OUTPATIENT
Start: 2019-05-29 | End: 2019-06-03 | Stop reason: HOSPADM

## 2019-05-29 RX ORDER — LANOLIN ALCOHOL/MO/W.PET/CERES
100 CREAM (GRAM) TOPICAL DAILY
Status: CANCELLED | OUTPATIENT
Start: 2019-05-30

## 2019-05-29 RX ORDER — MAGNESIUM SULFATE 100 %
4 CRYSTALS MISCELLANEOUS AS NEEDED
Status: CANCELLED | OUTPATIENT
Start: 2019-05-29

## 2019-05-29 RX ORDER — INSULIN LISPRO 100 [IU]/ML
5 INJECTION, SOLUTION INTRAVENOUS; SUBCUTANEOUS
Status: CANCELLED | OUTPATIENT
Start: 2019-05-29

## 2019-05-29 RX ORDER — DEXTROSE 50 % IN WATER (D50W) INTRAVENOUS SYRINGE
25-50 AS NEEDED
Status: CANCELLED | OUTPATIENT
Start: 2019-05-29

## 2019-05-29 RX ORDER — HYDRALAZINE HYDROCHLORIDE 20 MG/ML
20 INJECTION INTRAMUSCULAR; INTRAVENOUS
Status: CANCELLED | OUTPATIENT
Start: 2019-05-29

## 2019-05-29 RX ORDER — LORAZEPAM 2 MG/ML
2 INJECTION INTRAMUSCULAR
Status: DISCONTINUED | OUTPATIENT
Start: 2019-05-29 | End: 2019-05-30

## 2019-05-29 RX ORDER — HYDRALAZINE HYDROCHLORIDE 20 MG/ML
20 INJECTION INTRAMUSCULAR; INTRAVENOUS
Status: DISCONTINUED | OUTPATIENT
Start: 2019-05-29 | End: 2019-05-31

## 2019-05-29 RX ORDER — MAGNESIUM SULFATE 100 %
4 CRYSTALS MISCELLANEOUS AS NEEDED
Status: DISCONTINUED | OUTPATIENT
Start: 2019-05-29 | End: 2019-06-03 | Stop reason: HOSPADM

## 2019-05-29 RX ORDER — LANOLIN ALCOHOL/MO/W.PET/CERES
100 CREAM (GRAM) TOPICAL DAILY
Status: DISCONTINUED | OUTPATIENT
Start: 2019-05-30 | End: 2019-06-03 | Stop reason: HOSPADM

## 2019-05-29 RX ORDER — INSULIN LISPRO 100 [IU]/ML
5 INJECTION, SOLUTION INTRAVENOUS; SUBCUTANEOUS
Status: DISCONTINUED | OUTPATIENT
Start: 2019-05-29 | End: 2019-05-29 | Stop reason: HOSPADM

## 2019-05-29 RX ORDER — INSULIN GLARGINE 100 [IU]/ML
20 INJECTION, SOLUTION SUBCUTANEOUS DAILY
Status: DISCONTINUED | OUTPATIENT
Start: 2019-05-30 | End: 2019-05-29 | Stop reason: HOSPADM

## 2019-05-29 RX ORDER — DEXTROSE 50 % IN WATER (D50W) INTRAVENOUS SYRINGE
25-50 AS NEEDED
Status: DISCONTINUED | OUTPATIENT
Start: 2019-05-29 | End: 2019-06-03 | Stop reason: HOSPADM

## 2019-05-29 RX ORDER — KETOROLAC TROMETHAMINE 10 MG/1
10 TABLET, FILM COATED ORAL
Status: CANCELLED | OUTPATIENT
Start: 2019-05-29 | End: 2019-06-03

## 2019-05-29 RX ORDER — FOLIC ACID 1 MG/1
1 TABLET ORAL DAILY
Status: CANCELLED | OUTPATIENT
Start: 2019-05-30

## 2019-05-29 RX ORDER — INSULIN LISPRO 100 [IU]/ML
INJECTION, SOLUTION INTRAVENOUS; SUBCUTANEOUS
Status: CANCELLED | OUTPATIENT
Start: 2019-05-29

## 2019-05-29 RX ORDER — BENZTROPINE MESYLATE 2 MG/1
2 TABLET ORAL
Status: DISCONTINUED | OUTPATIENT
Start: 2019-05-29 | End: 2019-06-03 | Stop reason: HOSPADM

## 2019-05-29 RX ORDER — THERA TABS 400 MCG
1 TAB ORAL DAILY
Status: DISCONTINUED | OUTPATIENT
Start: 2019-05-30 | End: 2019-05-29 | Stop reason: HOSPADM

## 2019-05-29 RX ORDER — ACETAMINOPHEN 325 MG/1
650 TABLET ORAL
Status: DISCONTINUED | OUTPATIENT
Start: 2019-05-29 | End: 2019-06-01

## 2019-05-29 RX ORDER — INSULIN LISPRO 100 [IU]/ML
5 INJECTION, SOLUTION INTRAVENOUS; SUBCUTANEOUS
Status: DISCONTINUED | OUTPATIENT
Start: 2019-05-30 | End: 2019-06-02

## 2019-05-29 RX ORDER — FOLIC ACID 1 MG/1
1 TABLET ORAL DAILY
Status: DISCONTINUED | OUTPATIENT
Start: 2019-05-30 | End: 2019-05-29 | Stop reason: HOSPADM

## 2019-05-29 RX ORDER — KETOROLAC TROMETHAMINE 10 MG/1
10 TABLET, FILM COATED ORAL
Status: DISCONTINUED | OUTPATIENT
Start: 2019-05-29 | End: 2019-05-29 | Stop reason: CLARIF

## 2019-05-29 RX ORDER — THERA TABS 400 MCG
1 TAB ORAL DAILY
Status: CANCELLED | OUTPATIENT
Start: 2019-05-30

## 2019-05-29 RX ORDER — LORAZEPAM 2 MG/ML
2 INJECTION INTRAMUSCULAR
Status: CANCELLED | OUTPATIENT
Start: 2019-05-29

## 2019-05-29 RX ORDER — PANTOPRAZOLE SODIUM 40 MG/1
40 TABLET, DELAYED RELEASE ORAL DAILY
Status: DISCONTINUED | OUTPATIENT
Start: 2019-05-29 | End: 2019-05-29 | Stop reason: HOSPADM

## 2019-05-29 RX ORDER — ADHESIVE BANDAGE
30 BANDAGE TOPICAL DAILY PRN
Status: DISCONTINUED | OUTPATIENT
Start: 2019-05-29 | End: 2019-06-03 | Stop reason: HOSPADM

## 2019-05-29 RX ORDER — INSULIN LISPRO 100 [IU]/ML
9 INJECTION, SOLUTION INTRAVENOUS; SUBCUTANEOUS ONCE
Status: COMPLETED | OUTPATIENT
Start: 2019-05-29 | End: 2019-05-29

## 2019-05-29 RX ORDER — FOLIC ACID 1 MG/1
1 TABLET ORAL DAILY
Status: DISCONTINUED | OUTPATIENT
Start: 2019-05-30 | End: 2019-06-03 | Stop reason: HOSPADM

## 2019-05-29 RX ORDER — BENZTROPINE MESYLATE 1 MG/ML
2 INJECTION INTRAMUSCULAR; INTRAVENOUS
Status: DISCONTINUED | OUTPATIENT
Start: 2019-05-29 | End: 2019-06-03 | Stop reason: HOSPADM

## 2019-05-29 RX ORDER — PANTOPRAZOLE SODIUM 40 MG/1
40 TABLET, DELAYED RELEASE ORAL DAILY
Status: DISCONTINUED | OUTPATIENT
Start: 2019-05-30 | End: 2019-06-03 | Stop reason: HOSPADM

## 2019-05-29 RX ORDER — INSULIN GLARGINE 100 [IU]/ML
20 INJECTION, SOLUTION SUBCUTANEOUS DAILY
Status: CANCELLED | OUTPATIENT
Start: 2019-05-30

## 2019-05-29 RX ORDER — THERA TABS 400 MCG
1 TAB ORAL DAILY
Status: DISCONTINUED | OUTPATIENT
Start: 2019-05-30 | End: 2019-06-03 | Stop reason: HOSPADM

## 2019-05-29 RX ORDER — LANOLIN ALCOHOL/MO/W.PET/CERES
100 CREAM (GRAM) TOPICAL DAILY
Status: DISCONTINUED | OUTPATIENT
Start: 2019-05-30 | End: 2019-05-29 | Stop reason: HOSPADM

## 2019-05-29 RX ORDER — INSULIN GLARGINE 100 [IU]/ML
20 INJECTION, SOLUTION SUBCUTANEOUS DAILY
Status: DISCONTINUED | OUTPATIENT
Start: 2019-05-30 | End: 2019-06-01

## 2019-05-29 RX ORDER — KETOROLAC TROMETHAMINE 10 MG/1
10 TABLET, FILM COATED ORAL
Status: DISCONTINUED | OUTPATIENT
Start: 2019-05-29 | End: 2019-05-29 | Stop reason: HOSPADM

## 2019-05-29 RX ORDER — INSULIN LISPRO 100 [IU]/ML
INJECTION, SOLUTION INTRAVENOUS; SUBCUTANEOUS
Status: DISCONTINUED | OUTPATIENT
Start: 2019-05-29 | End: 2019-06-03 | Stop reason: HOSPADM

## 2019-05-29 RX ORDER — IBUPROFEN 400 MG/1
400 TABLET ORAL
Status: DISCONTINUED | OUTPATIENT
Start: 2019-05-29 | End: 2019-05-29 | Stop reason: HOSPADM

## 2019-05-29 RX ORDER — TRAZODONE HYDROCHLORIDE 50 MG/1
50 TABLET ORAL
Status: DISCONTINUED | OUTPATIENT
Start: 2019-05-29 | End: 2019-06-03 | Stop reason: HOSPADM

## 2019-05-29 RX ADMIN — LORAZEPAM 4 MG: 2 INJECTION, SOLUTION INTRAMUSCULAR; INTRAVENOUS at 11:24

## 2019-05-29 RX ADMIN — Medication 10 ML: at 14:00

## 2019-05-29 RX ADMIN — CLONIDINE HYDROCHLORIDE 0.1 MG: 0.1 TABLET ORAL at 06:53

## 2019-05-29 RX ADMIN — ACETAMINOPHEN 650 MG: 325 TABLET ORAL at 19:20

## 2019-05-29 RX ADMIN — INSULIN LISPRO 5 UNITS: 100 INJECTION, SOLUTION INTRAVENOUS; SUBCUTANEOUS at 11:30

## 2019-05-29 RX ADMIN — CLONIDINE HYDROCHLORIDE 0.3 MG: 0.1 TABLET ORAL at 10:36

## 2019-05-29 RX ADMIN — OLANZAPINE 5 MG: 5 TABLET, FILM COATED ORAL at 19:20

## 2019-05-29 RX ADMIN — LORAZEPAM 4 MG: 2 INJECTION, SOLUTION INTRAMUSCULAR; INTRAVENOUS at 07:05

## 2019-05-29 RX ADMIN — INSULIN GLARGINE 16 UNITS: 100 INJECTION, SOLUTION SUBCUTANEOUS at 10:39

## 2019-05-29 RX ADMIN — TRAZODONE HYDROCHLORIDE 50 MG: 50 TABLET ORAL at 21:30

## 2019-05-29 RX ADMIN — INSULIN LISPRO 2 UNITS: 100 INJECTION, SOLUTION INTRAVENOUS; SUBCUTANEOUS at 21:00

## 2019-05-29 RX ADMIN — FOLIC ACID 1 MG: 1 TABLET ORAL at 10:39

## 2019-05-29 RX ADMIN — INSULIN LISPRO 5 UNITS: 100 INJECTION, SOLUTION INTRAVENOUS; SUBCUTANEOUS at 12:23

## 2019-05-29 RX ADMIN — HYDROXYZINE HYDROCHLORIDE 50 MG: 50 TABLET, FILM COATED ORAL at 20:20

## 2019-05-29 RX ADMIN — LORAZEPAM 4 MG: 2 INJECTION, SOLUTION INTRAMUSCULAR; INTRAVENOUS at 02:16

## 2019-05-29 RX ADMIN — PANTOPRAZOLE SODIUM 40 MG: 40 TABLET, DELAYED RELEASE ORAL at 11:25

## 2019-05-29 RX ADMIN — Medication 10 ML: at 06:23

## 2019-05-29 RX ADMIN — FOLIC ACID: 5 INJECTION, SOLUTION INTRAMUSCULAR; INTRAVENOUS; SUBCUTANEOUS at 11:23

## 2019-05-29 RX ADMIN — LACTULOSE 15 ML: 20 SOLUTION ORAL at 07:02

## 2019-05-29 RX ADMIN — CLONIDINE HYDROCHLORIDE 0.3 MG: 0.1 TABLET ORAL at 17:52

## 2019-05-29 RX ADMIN — INSULIN LISPRO 7 UNITS: 100 INJECTION, SOLUTION INTRAVENOUS; SUBCUTANEOUS at 16:53

## 2019-05-29 RX ADMIN — INSULIN LISPRO 9 UNITS: 100 INJECTION, SOLUTION INTRAVENOUS; SUBCUTANEOUS at 06:44

## 2019-05-29 RX ADMIN — LACTULOSE 15 ML: 20 SOLUTION ORAL at 16:52

## 2019-05-29 RX ADMIN — ONDANSETRON 4 MG: 2 INJECTION INTRAMUSCULAR; INTRAVENOUS at 02:17

## 2019-05-29 RX ADMIN — INSULIN LISPRO 5 UNITS: 100 INJECTION, SOLUTION INTRAVENOUS; SUBCUTANEOUS at 16:52

## 2019-05-29 NOTE — PROGRESS NOTES
Physical therapy: 
 
Orders received and chart reviewed. Pt received sleeping in bed with sitter present in room. Pt would not arouse to voice and sitter reported he prefers if you tap him to wake him up. Gently tapped patient and pt said, \"Leave me alone. I don't want to do this right now! \"   Session deferred for now. Will continue to follow. Thank you Alma Toscano, PT, DPT

## 2019-05-29 NOTE — BH NOTES
TRANSFER - IN REPORT: 
 
Verbal report received from Lucrecia zabala Rosa Currie  being received from  [facility] for routine progression of care Report consisted of patients Situation, Background, Assessment and  
Recommendations(SBAR). Information from the following report(s) SBAR was reviewed with the receiving nurse. Opportunity for questions and clarification was provided. Assessment completed upon patients arrival to unit and care assumed.

## 2019-05-29 NOTE — PROGRESS NOTES
Occupational Therapy Acknowledge OT order, completed chart review and discussed patient with nursing who cleared for therapy. Patient admitted for suicidal ideation and homicidal ideations. Sitter present. Attempted OT eval.  Patient sleeping, upon waking he reports \"I dont want to do that right now. \"  Will continue to follow for OT eval.  
 
Thank you, Bravo Sandhu, OT

## 2019-05-29 NOTE — DIABETES MGMT
DTC Consult Note Recommendations/ Comments: Chart reviewed on Mally Murphy for hospital glucose management/ hyperglycemia. If appropriate, please consider: 
 - addition of Lispro pre-meal, 4 units tid ac (concern for use of orals with liver disease) - increase Lantus to 20 units DTC will see paient once appropriate. Current hospital DM medication: Lispro correction, normal sensitivity, Lantus 16 units Patient is a 44 y.o. male with known diabetes, no home medications listed on PTA. A1c:  
Lab Results Component Value Date/Time Hemoglobin A1c 11.0 (H) 05/25/2019 07:20 PM  
 Hemoglobin A1c 6.4 (H) 08/05/2013 06:00 AM  
 
 
Recent Glucose Results:  
Lab Results Component Value Date/Time GLUCPOC 359 (H) 05/29/2019 06:34 AM  
 GLUCPOC 350 (H) 05/28/2019 09:21 PM  
 GLUCPOC 325 (H) 05/28/2019 03:55 PM  
  
 
Lab Results Component Value Date/Time Creatinine 1.01 05/28/2019 03:04 AM  
 
Estimated Creatinine Clearance: 88.2 mL/min (based on SCr of 1.01 mg/dL). Active Orders Diet DIET DIABETIC CONSISTENT CARB Regular PO intake:  
Patient Vitals for the past 72 hrs: 
 % Diet Eaten 05/29/19 0028 100 % 05/28/19 1759 75 % 05/28/19 0930 80 % 05/27/19 1348 90 % Will continue to follow as needed. Thank you Alexia Jara RD, CDE Time spent: 6 minutes
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
## 2019-05-29 NOTE — BH NOTES
PRN Medication Documentation Specific patient behavior that led to need for PRN medication: agitated,pt states\"im homicidal!!\" 
Staff interventions attempted prior to PRN being given:redirection PRN medication given: zyprexa,atarax 2020 Patient response/effectiveness of PRN medication: tl aware

## 2019-05-29 NOTE — PROGRESS NOTES
Hospitalist Progress Note Wilfred Hernandez MD 
Answering service: [phone] OR [phone] from in house phone Date of Service:  2019 NAME:  Leeanne Huynh :  1970 MRN:  [id number] Admission Summary:  
45 Y/o gentleman with a PMH significant for Alcohol Use Disorder, Liver Cirrhosis, Hepatitis C, Bipolar 1 Disorder, and Primary Hypertension was brought to the ER via EMS after being found slumped over in the stairwell at an extended stay hotel. Patient was a poor historian, unable to give any pertinent history. Interval history / Subjective: Pt was agitated asking for pain meds Offered ibuprofen. / tramadol/ Toradol and told him cant give narcotics due to abnormal LFT's and encephalopathy Pt had 0 CIWA this morning seen by psyche recommend psyche admission He is alert and oriented x 3 pt is non co- operative and reluctant to talk Assessment & Plan:  
 
1)  Acute on probable chronic Hepatic encephalopathy due to Liver Cirrhosis with Hyperammonemia present on admission. CT head without contrast negative for any acute findings. Continue with scheduled lactulose 2) Alcoholic Liver Cirrhosis with mild coagulopathy. Hepatitis C.  
CT Abdomen and pelvis with some Splenomegaly and portal hypertension. Dr. Mitch Nesbitt following 3) Uncontrolled Primary Hypertension. 2D ECHO to evaluate LV function. stble if necessary use BB 
 
4)Uncontrolled Type 2 DM with complications including Hyperglycemia. A1C of 11 Accucheck monitoring, sliding scale insulin, Diabetic teaching. Diabetes Educator consult. On Lantus 20  Units and SSI will add meal coverage standing 5) Thrombocytopenia probably due to the Alcoholic Liver Disease. No bleeding diatheses. 5) Nutrition: Daily multivitamin/mineral, Thiamine and Folic Acid supplements. 6) Alcohol Use Disorder. Cessation counselling done. CIWA protocol. Code status: Full DVT prophylaxis: scd Care Plan discussed with: Patient/Family and Nurse Disposition: TBD pt can go to psyche floor if refuses will initiate TDO Hospital Problems  Date Reviewed: 5/26/2019 Codes Class Noted POA Hepatic encephalopathy ([facility] ICD-10-CM: K72.90 ICD-9-CM: 572.2  5/26/2019 Yes Hyperammonemia (HCC) ICD-10-CM: K59.38 ICD-9-CM: 270.6  5/26/2019 Yes Thrombocytopenia ([facility] ICD-10-CM: D69.6 ICD-9-CM: 287.5  5/26/2019 Yes Liver cirrhosis, alcoholic ([facility] [id number]: K70.30 ICD-9-CM: 571.2  5/26/2019 Yes Acute hepatic encephalopathy ICD-10-CM: K72.00 ICD-9-CM: 572.2  5/26/2019 Unknown Unresponsiveness ICD-10-CM: R41.89 ICD-9-CM: 780.09  5/25/2019 Unknown Review of Systems:  
Review of systems not obtained due to patient factors. Ct Head Wo Cont Result Date: 5/25/2019 No evidence of acute process. Ct Abd Pelv W Cont Result Date: 5/26/2019 1. No acute finding. 2. Cirrhosis with portal venous hypertension/varicosities. 3. Borderline splenomegaly. 4. No ascites. 5. Atherosclerotic aorta. 6. Nonobstructive nephrolithiasis. 7. Umbilical/ventral hernias. Xr Chest [place] Result Date: 5/25/2019 Impression: No acute cardiopulmonary process. Vital Signs:  
 Last 24hrs VS reviewed since prior progress note. Most recent are: 
Visit Vitals BP (!) 149/93 Pulse 95 Temp 98.1 °F (36.7 °C) Resp 20 Ht 5' 6\" (1.676 m) Wt 84.6 kg (186 lb 8.2 oz) SpO2 100% BMI 30.10 kg/m² Intake/Output Summary (Last 24 hours) at 5/29/2019 1047 Last data filed at 5/29/2019 7682 Gross per 24 hour Intake 1820 ml Output 1701 ml Net 119 ml Physical Examination:  
 
 
     
Constitutional:  No acute distress, cooperative, ENT:  Oral mucous moist, Resp:  CTA bilaterally. CV:  Regular rhythm, normal rate, GI:  Soft, non distended, non tender. bs+ Musculoskeletal:  No edema, warm, 2+ pulses throughout Neurologic:  Moves all extremities. Data Review:  
 I personally reviewed  Image and labs Ct Head Wo Cont Result Date: 5/25/2019 No evidence of acute process. Ct Abd Pelv W Cont Result Date: 5/26/2019 1. No acute finding. 2. Cirrhosis with portal venous hypertension/varicosities. 3. Borderline splenomegaly. 4. No ascites. 5. Atherosclerotic aorta. 6. Nonobstructive nephrolithiasis. 7. Umbilical/ventral hernias. Xr Chest Yvelauryn Davis Result Date: 5/25/2019 Impression: No acute cardiopulmonary process. Labs:  
 
Recent Labs  
  05/28/19 
0304 05/27/19 
0110 WBC 4.0* 8.0 HGB 10.4* 10.8* HCT 34.8* 36.0*  
PLT 94* 146* Recent Labs  
  05/28/19 
0304 05/27/19 
0110  136  
K 3.5 3.1*  
 104 CO2 23 23 BUN 20 11 CREA 1.01 1.08  
* 382* CA 9.1 9.2 MG 1.7 1.5* PHOS 2.8  --   
 
Recent Labs  
  05/28/19 
0304 05/27/19 
0110 SGOT 25 31 ALT 27 34  118* TBILI 0.8 1.5* TP 7.2 7.7 ALB 2.5* 2.7*  
GLOB 4.7* 5.0* No results for input(s): INR, PTP, APTT in the last 72 hours. No lab exists for component: INREXT, INREXT No results for input(s): FE, TIBC, PSAT, FERR in the last 72 hours. Lab Results Component Value Date/Time Folate >24.0 (H) 08/09/2013 04:30 PM  
  
No results for input(s): PH, PCO2, PO2 in the last 72 hours. No results for input(s): CPK, CKNDX, TROIQ in the last 72 hours. No lab exists for component: CPKMB No results found for: CHOL, CHOLX, CHLST, CHOLV, HDL, LDL, LDLC, DLDLP, TGLX, TRIGL, TRIGP, CHHD, CHHDX Lab Results Component Value Date/Time Glucose (POC) 359 (H) 05/29/2019 06:34 AM  
 Glucose (POC) 350 (H) 05/28/2019 09:21 PM  
 Glucose (POC) 325 (H) 05/28/2019 03:55 PM  
 Glucose (POC) 253 (H) 05/28/2019 11:40 AM  
 Glucose (POC) 383 (H) 05/28/2019 06:48 AM  
 
Lab Results Component Value Date/Time  Color YELLOW/STRAW 05/25/2019 08:16 PM  
 Appearance CLEAR 05/25/2019 08:16 PM  
 Specific gravity 1.025 05/25/2019 08:16 PM  
 pH (UA) 5.0 05/25/2019 08:16 PM  
 Protein 30 (A) 05/25/2019 08:16 PM  
 Glucose >1,000 (A) 05/25/2019 08:16 PM  
 Ketone NEGATIVE  05/25/2019 08:16 PM  
 Bilirubin NEGATIVE  05/25/2019 08:16 PM  
 Urobilinogen 1.0 05/25/2019 08:16 PM  
 Nitrites NEGATIVE  05/25/2019 08:16 PM  
 Leukocyte Esterase NEGATIVE  05/25/2019 08:16 PM  
 Epithelial cells FEW 05/25/2019 08:16 PM  
 Bacteria NEGATIVE  05/25/2019 08:16 PM  
 WBC 0-4 05/25/2019 08:16 PM  
 RBC 10-20 05/25/2019 08:16 PM  
 
 
 
Medications Reviewed:  
 
Current Facility-Administered Medications Medication Dose Route Frequency  insulin lispro (HUMALOG) injection   SubCUTAneous AC&HS  insulin glargine (LANTUS) injection 16 Units  16 Units SubCUTAneous DAILY  folic acid (FOLVITE) tablet 1 mg  1 mg Oral DAILY  lactulose (CHRONULAC) 10 gram/15 mL solution 15 mL  10 g Oral BID  
 glucose chewable tablet 16 g  4 Tab Oral PRN  
 dextrose (D50W) injection syrg 12.5-25 g  25-50 mL IntraVENous PRN  
 glucagon (GLUCAGEN) injection 1 mg  1 mg IntraMUSCular PRN  
 0.9% sodium chloride 7,951 mL with folic acid 1 mg, thiamine 100 mg, mvi, adult no. 4 with vit K 10 mL infusion   IntraVENous DAILY  LORazepam (ATIVAN) injection 2 mg  2 mg IntraVENous Q1H PRN  
 cloNIDine HCl (CATAPRES) tablet 0.1 mg  0.1 mg Oral Q6H PRN  
 cloNIDine HCl (CATAPRES) tablet 0.3 mg  0.3 mg Oral BID  traMADol (ULTRAM) tablet 50 mg  50 mg Oral Q6H PRN  
 LORazepam (ATIVAN) injection 4 mg  4 mg IntraVENous Q1H PRN  
 hydrALAZINE (APRESOLINE) 20 mg/mL injection 20 mg  20 mg IntraVENous Q4H PRN  
 cloNIDine (CATAPRES) 0.1 mg/24 hr patch 1 Patch  1 Patch TransDERmal ONCE  
 sodium chloride (NS) flush 5-40 mL  5-40 mL IntraVENous Q8H  
 sodium chloride (NS) flush 5-40 mL  5-40 mL IntraVENous PRN  
 acetaminophen (TYLENOL) tablet 650 mg  650 mg Oral Q4H PRN  
 ondansetron (ZOFRAN) injection 4 mg  4 mg IntraVENous Q4H PRN  
 
 ______________________________________________________________________ EXPECTED LENGTH OF STAY: - - - 
ACTUAL LENGTH OF STAY:          3 Amarilis Cat MD

## 2019-05-29 NOTE — PROGRESS NOTES
Problem: Diabetes Self-Management Goal: *Disease process and treatment process Description Define diabetes and identify own type of diabetes; list 3 options for treating diabetes. Outcome: Progressing Towards Goal 
Goal: *Incorporating nutritional management into lifestyle Description Describe effect of type, amount and timing of food on blood glucose; list 3 methods for planning meals. Outcome: Progressing Towards Goal 
Goal: *Incorporating physical activity into lifestyle Description State effect of exercise on blood glucose levels. Outcome: Progressing Towards Goal 
Goal: *Developing strategies to promote health/change behavior Description Define the ABC's of diabetes; identify appropriate screenings, schedule and personal plan for screenings. Outcome: Progressing Towards Goal 
Goal: *Using medications safely Description State effect of diabetes medications on diabetes; name diabetes medication taking, action and side effects. Outcome: Progressing Towards Goal 
Goal: *Monitoring blood glucose, interpreting and using results Description Identify recommended blood glucose targets  and personal targets. Outcome: Progressing Towards Goal 
Goal: *Prevention, detection, treatment of acute complications Description List symptoms of hyper- and hypoglycemia; describe how to treat low blood sugar and actions for lowering  high blood glucose level. Outcome: Progressing Towards Goal 
Goal: *Prevention, detection and treatment of chronic complications Description Define the natural course of diabetes and describe the relationship of blood glucose levels to long term complications of diabetes. Outcome: Progressing Towards Goal 
Goal: *Developing strategies to address psychosocial issues Description Describe feelings about living with diabetes; identify support needed and support network Outcome: Progressing Towards Goal 
Goal: *Insulin pump training Outcome: Progressing Towards Goal 
 Goal: *Sick day guidelines Outcome: Progressing Towards Goal 
Goal: *Patient Specific Goal (EDIT GOAL, INSERT TEXT) Outcome: Progressing Towards Goal 
  
Problem: Patient Education: Go to Patient Education Activity Goal: Patient/Family Education Outcome: Progressing Towards Goal 
  
Problem: Falls - Risk of 
Goal: *Absence of Falls Description Document Lam Owens Fall Risk and appropriate interventions in the flowsheet. Outcome: Progressing Towards Goal 
  
Problem: Patient Education: Go to Patient Education Activity Goal: Patient/Family Education Outcome: Progressing Towards Goal 
  
Problem: Pressure Injury - Risk of 
Goal: *Prevention of pressure injury Description Document Anuj Scale and appropriate interventions in the flowsheet. Outcome: Progressing Towards Goal 
  
Problem: Patient Education: Go to Patient Education Activity Goal: Patient/Family Education Outcome: Progressing Towards Goal 
  
Problem: Alcohol Withdrawal 
Goal: *STG: Vital signs within defined limits Outcome: Progressing Towards Goal 
Goal: Interventions Outcome: Progressing Towards Goal 
1041: CIWAS is 16. Patient had 4mg of Ativan. 1735: TRANSFER - OUT REPORT: 
Verbal report given to Lisbeth Hernandez RN(name) on Rosa Latin  being transferred to [facility] 728(unit) for routine progression of care Report consisted of patient?s Situation, Background, Assessment and  
Recommendations(SBAR). Information from the following report(s) SBAR, Kardex, Intake/Output, MAR and Recent Results was reviewed with the receiving nurse. Lines:  
Peripheral IV 05/29/19 Right Forearm (Active) Site Assessment Clean, dry, & intact 5/29/2019 12:47 PM  
Phlebitis Assessment 0 5/29/2019 12:47 PM  
Infiltration Assessment 0 5/29/2019 12:47 PM  
Dressing Status Clean, dry, & intact 5/29/2019 12:47 PM  
Dressing Type Tape;Transparent 5/29/2019 12:47 PM  
Hub Color/Line Status Blue; Infusing 5/29/2019 12:47 PM  
 Action Taken Open ports on tubing capped 5/29/2019 12:47 PM  
Alcohol Cap Used Yes 5/29/2019 12:47 PM  
 
  
 
Opportunity for questions and clarification was provided. Patient transported with: 
 Registered Nurse Tech

## 2019-05-29 NOTE — PROGRESS NOTES
Clinical Pharmacy Note: IV to PO Automatic Conversion Please note: Cecily Mathew?s medication(s) (thiamine, folic acid, MVI) have been changed from IV to PO based on the following critiera: 
 
Patient is taking scheduled oral medications Patient is tolerating tube feeds at goal rate or a full liquid, soft or regular diet This IV to PO conversion is based on the [employer] approved automatic conversion policy for eligible patients. Please call with questions.

## 2019-05-29 NOTE — PROGRESS NOTES
Bedside and Verbal shift change report given to [address] (oncoming nurse) by Raheel Jacobsen (offgoing nurse). Report included the following information SBAR, Kardex, Intake/Output, MAR, Accordion and Recent Results. Pt states he is still suicidal and homicidal. Pt states he wants to kill the first person he sees. Pt stated that this does not include the RN or sitter in room. Pt states his headache is severe and not controlled on pain meds- RICARDO [place] notified and ordered 600mg ibuprofen. Pt agitated and aggressive verbally at times. Gave ativan as ordered per [facility] protocol. Last 3 Recorded Weights in this Encounter 05/27/19 8277 05/28/19 0404 05/29/19 0321 Weight: 86 kg (189 lb 9.6 oz) 83.5 kg (184 lb 1.4 oz) 84.6 kg (186 lb 8.2 oz) Problem: Falls - Risk of 
Goal: *Absence of Falls Description Document Allyson Velazco Fall Risk and appropriate interventions in the flowsheet. Outcome: Progressing Towards Goal 
Note:  
Fall Risk Interventions: 
Mobility Interventions: Bed/chair exit alarm, Communicate number of staff needed for ambulation/transfer, Patient to call before getting OOB, Strengthening exercises (ROM-active/passive) Mentation Interventions: Adequate sleep, hydration, pain control, Bed/chair exit alarm, Door open when patient unattended, Evaluate medications/consider consulting pharmacy, Family/sitter at bedside, Increase mobility, More frequent rounding, Reorient patient, Room close to nurse's station, Toileting rounds, Update white board Medication Interventions: Evaluate medications/consider consulting pharmacy, Bed/chair exit alarm, Patient to call before getting OOB, Teach patient to arise slowly Elimination Interventions: Bed/chair exit alarm, Call light in reach, Patient to call for help with toileting needs, Toileting schedule/hourly rounds, Urinal in reach History of Falls Interventions: Bed/chair exit alarm, Consult care management for discharge planning, Door open when patient unattended, Evaluate medications/consider consulting pharmacy, Room close to nurse's station Problem: Pressure Injury - Risk of 
Goal: *Prevention of pressure injury Description Document Anuj Scale and appropriate interventions in the flowsheet. Outcome: Progressing Towards Goal 
Note:  
Pressure Injury Interventions: 
Sensory Interventions: Assess changes in LOC, Check visual cues for pain, Keep linens dry and wrinkle-free, Maintain/enhance activity level, Minimize linen layers Moisture Interventions: Absorbent underpads, Check for incontinence Q2 hours and as needed, Limit adult briefs, Minimize layers, Offer toileting Q_hr Activity Interventions: Increase time out of bed, Pressure redistribution bed/mattress(bed type) Mobility Interventions: PT/OT evaluation Nutrition Interventions: Document food/fluid/supplement intake Friction and Shear Interventions: Lift sheet, Minimize layers Problem: Alcohol Withdrawal 
Goal: Interventions Outcome: Progressing Towards Goal

## 2019-05-29 NOTE — PROGRESS NOTES
[facility] 
  
  Misha Petersen MD, [place], [place]  
  
 GERARD Romero, [facility] April Yu Nobles, RICARDO Ku NP [address] [facility] 
  at [address], [address] Briana Nieto  22. 
  [phone] FAX: [address] 
  [facility] 
  [address], [address] Samra [facility], [address] 
  [phone] FAX: [phone]  
  
  
HISTORY: 
The patient is a 44year old black male who was found unresponsive at a hotel where he was staying. He was brought to the [facility] by EMS. Labs in the ED were significant for a elevated blood alcohol of 189, Ammonia 81, platelet count 192. Other labs fairlry unremarkable. CT scan suggested cirrhosis without ascites. Serology from 2013 is positive for HCV. He is more arousable today but still but fully oriented and cannot answer all questions clearly. 
  
ASSESSMENT AND PLAN: 
Cirrhosis Cirrhosis that secondary to chronic HCV and alcohol. The diagnosis of cirrhosis is based upon imaging, laboratory studies, complications of cirrhosis. The CTP is 9. Child class B. The MELD score is 11. 
  
Chronic HCV Chronic HCV with cirrhosis. Liver transaminases are normal.  ALP is elevated. Total bilirubin is elevated. Serum albumin is depressed. INR is normal.  The platelet count is depressed. Await HCV RNA and genotype The patient has not been treated for HCV.   
  
Hepatic encephalopathy Overt HE is improved. He is waking up. Continue PO lactulose 
  
Anemia This is due to multifactorial causes including portal hypertension with chronic GI blood loss bone marrow suppression secondary to malnutrition and alcohol. Will obtain FE panel to assess for iron stores. There are no signs of active GI bleeding. No reason to perform EGD at this time 
  
Thrombocytopenia This is secondary to cirrhosis. There is no evidence of overt bleeding. No treatment is required. The platelet count is adequate for the patient to undergo procedures without the need for platelet transfusion or platelet growth factors. 
  
PHYSICAL EXAMINATION: 
VS: per nursing note General:  NOt very cooperative Eyes:  Sclera anicteric. ENT:  No oral lesions. Thyroid normal. 
Nodes:  No adenopathy. Skin:  No spider angiomata. No jaundice. Respiratory:  Lungs clear to auscultation. Cardiovascular:  Regular heart rate. Abdomen:  Soft non-tender, No obvious ascites. Extremities:  No lower extremity edema. Some muscle wasting. Neurologic:  Awake but lethargic. Unable to assess for asterixis. 
  
  
LABORATORY: 
Results for Reed Crain (MRN [id number]) as of 5/29/2019 07:12 Ref. Range 5/25/2019 19:18 5/27/2019 01:10 5/28/2019 03:04 WBC Latest Ref Range: 4.1 - 11.1 K/uL 5.7 8.0 4.0 (L) HGB Latest Ref Range: 12.1 - 17.0 g/dL 10.4 (L) 10.8 (L) 10.4 (L) PLATELET Latest Ref Range: 150 - 400 K/uL 121 (L) 146 (L) 94 (L) INR Latest Ref Range: 0.9 - 1.1   1.2 (H) Sodium Latest Ref Range: 136 - 145 mmol/L 140 136 138 Potassium Latest Ref Range: 3.5 - 5.1 mmol/L 3.6 3.1 (L) 3.5 Chloride Latest Ref Range: 97 - 108 mmol/L 107 104 107 CO2 Latest Ref Range: 21 - 32 mmol/L 24 23 23 Glucose Latest Ref Range: 65 - 100 mg/dL 385 (H) 382 (H) 294 (H) BUN Latest Ref Range: 6 - 20 MG/DL 12 11 20 Creatinine Latest Ref Range: 0.70 - 1.30 MG/DL 1.11 1.08 1.01 Bilirubin, total Latest Ref Range: 0.2 - 1.0 MG/DL 0.7 1.5 (H) 0.8 Albumin Latest Ref Range: 3.5 - 5.0 g/dL 3.0 (L) 2.7 (L) 2.5 (L) ALT (SGPT) Latest Ref Range: 12 - 78 U/L 40 34 27 AST Latest Ref Range: 15 - 37 U/L 36 31 25 Alk. phosphatase Latest Ref Range: 45 - 117 U/L 119 (H) 118 (H) 106 Ammonia Latest Ref Range: <32 UMOL/L 81 (H) 60 (H) 61 (H) MD Vandana Espitia [facility] [address] [address], [address] [facility] [address]. 
[phone] [address] [facility]

## 2019-05-29 NOTE — PROGRESS NOTES
[address] 
  
  Gi Cui MD, Corina Díaz, [place]  
  
 GERARD Holliday, [facility] April Eric York, RICARDO Callaway, RICARDO Ochoa [facility] [address] 
  at [place] [id number] [facility] [place], [address] Briana Nieto  22. 
  [phone] FAX: [address] 
  [facility] 
  [address], [address] 98 [facility] Katerin Preciado, [address] 
  [phone] FAX: [phone]  
  
  
HEPATOLOGY CONSULT NOTE The patient is a 44year old black male who was found unresponsive at a hotel where he was staying. He was brought to the [facility] [place] ED by EMS. Labs in the ED were significant for a elevated blood alcohol of 189, Ammonia 81, platelet count 634. Other labs fairlry unremarkable. CT scan suggested cirrhosis without ascites. Serology from 2013 is positive for HCV. He has now woken up. He is alert and can answer questions. He is not really cooperative. HCV RNA has returned undetectable. 
  
ASSESSMENT AND PLAN: 
Cirrhosis Cirrhosis that secondary to alcohol. He does not have chronic HCV The diagnosis of cirrhosis is based upon imaging, laboratory studies, complications of cirrhosis. The CTP is 9. Child class B. The MELD score is 11. 
  
ANTI-HCV positive HCV RNA negative The patient has been exposed to HCV but has had spontaneous resolution. No further testing for HCV RNA is required. 
  
Hepatic encephalopathy Overt HE has resolved with treatment Continue lactulose BID.   
  
Anemia This is due to multifactorial causes including portal hypertension with chronic GI blood loss bone marrow suppression secondary to malnutrition and alcohol. Will obtain FE panel to assess for iron stores. There are no signs of active GI bleeding. No reason to perform EGD at this time   
Thrombocytopenia This is secondary to cirrhosis. There is no evidence of overt bleeding. No treatment is required. The platelet count is adequate for the patient to undergo procedures without the need for platelet transfusion or platelet growth factors. 
  
PHYSICAL EXAMINATION: 
VS: per nursing note General:  Sleeping comfortably. Eyes:  Sclera anicteric. ENT:  No oral lesions. Thyroid normal. 
Nodes:  No adenopathy. Skin:  No spider angiomata. No jaundice. Respiratory:  Lungs clear to auscultation. Cardiovascular:  Regular heart rate. Abdomen:  Soft non-tender, No obvious ascites. Extremities:  No lower extremity edema. Some muscle wasting. Neurologic:  Lethargic. Difficult to arouse. Unable to assess for asterixis. LABORATORY Results for Melanie Ambrocio (MRN [id number]) as of 5/29/2019 18:54 Ref. Range 5/25/2019 19:18 5/27/2019 01:10 5/28/2019 03:04 WBC Latest Ref Range: 4.1 - 11.1 K/uL 5.7 8.0 4.0 (L) HGB Latest Ref Range: 12.1 - 17.0 g/dL 10.4 (L) 10.8 (L) 10.4 (L) PLATELET Latest Ref Range: 150 - 400 K/uL 121 (L) 146 (L) 94 (L) INR Latest Ref Range: 0.9 - 1.1   1.2 (H) Sodium Latest Ref Range: 136 - 145 mmol/L 140 136 138 Potassium Latest Ref Range: 3.5 - 5.1 mmol/L 3.6 3.1 (L) 3.5 Chloride Latest Ref Range: 97 - 108 mmol/L 107 104 107 CO2 Latest Ref Range: 21 - 32 mmol/L 24 23 23 Glucose Latest Ref Range: 65 - 100 mg/dL 385 (H) 382 (H) 294 (H) BUN Latest Ref Range: 6 - 20 MG/DL 12 11 20 Creatinine Latest Ref Range: 0.70 - 1.30 MG/DL 1.11 1.08 1.01 Bilirubin, total Latest Ref Range: 0.2 - 1.0 MG/DL 0.7 1.5 (H) 0.8 Albumin Latest Ref Range: 3.5 - 5.0 g/dL 3.0 (L) 2.7 (L) 2.5 (L) ALT (SGPT) Latest Ref Range: 12 - 78 U/L 40 34 27 AST Latest Ref Range: 15 - 37 U/L 36 31 25 Alk. phosphatase Latest Ref Range: 45 - 117 U/L 119 (H) 118 (H) 106 Ammonia Latest Ref Range: <32 UMOL/L 81 (H) 60 (H) 61 (H) Charu Rob MD 
 Vandana Welch [address] [place] [address], [address] [place]er  22. 
[phone] [address] [place] [address]

## 2019-05-30 LAB
GLUCOSE BLD STRIP.AUTO-MCNC: 179 MG/DL (ref 65–100)
GLUCOSE BLD STRIP.AUTO-MCNC: 240 MG/DL (ref 65–100)
GLUCOSE BLD STRIP.AUTO-MCNC: 245 MG/DL (ref 65–100)
GLUCOSE BLD STRIP.AUTO-MCNC: 291 MG/DL (ref 65–100)
SERVICE CMNT-IMP: ABNORMAL

## 2019-05-30 PROCEDURE — 82962 GLUCOSE BLOOD TEST: CPT

## 2019-05-30 PROCEDURE — 65220000003 HC RM SEMIPRIVATE PSYCH

## 2019-05-30 PROCEDURE — 74011250637 HC RX REV CODE- 250/637: Performed by: PSYCHIATRY & NEUROLOGY

## 2019-05-30 PROCEDURE — 74011636637 HC RX REV CODE- 636/637: Performed by: HOSPITALIST

## 2019-05-30 PROCEDURE — 74011250637 HC RX REV CODE- 250/637: Performed by: HOSPITALIST

## 2019-05-30 RX ORDER — CITALOPRAM 20 MG/1
20 TABLET, FILM COATED ORAL DAILY
Status: DISCONTINUED | OUTPATIENT
Start: 2019-05-30 | End: 2019-06-03 | Stop reason: HOSPADM

## 2019-05-30 RX ORDER — CITALOPRAM 20 MG/1
TABLET, FILM COATED ORAL
Status: DISPENSED
Start: 2019-05-30 | End: 2019-05-31

## 2019-05-30 RX ADMIN — INSULIN LISPRO 3 UNITS: 100 INJECTION, SOLUTION INTRAVENOUS; SUBCUTANEOUS at 08:21

## 2019-05-30 RX ADMIN — FOLIC ACID 1 MG: 1 TABLET ORAL at 08:20

## 2019-05-30 RX ADMIN — CLONIDINE HYDROCHLORIDE 0.3 MG: 0.1 TABLET ORAL at 18:00

## 2019-05-30 RX ADMIN — INSULIN LISPRO 5 UNITS: 100 INJECTION, SOLUTION INTRAVENOUS; SUBCUTANEOUS at 08:22

## 2019-05-30 RX ADMIN — LACTULOSE 15 ML: 20 SOLUTION ORAL at 08:19

## 2019-05-30 RX ADMIN — INSULIN LISPRO 5 UNITS: 100 INJECTION, SOLUTION INTRAVENOUS; SUBCUTANEOUS at 12:44

## 2019-05-30 RX ADMIN — INSULIN LISPRO 2 UNITS: 100 INJECTION, SOLUTION INTRAVENOUS; SUBCUTANEOUS at 20:58

## 2019-05-30 RX ADMIN — OLANZAPINE 5 MG: 5 TABLET, FILM COATED ORAL at 16:15

## 2019-05-30 RX ADMIN — Medication 100 MG: at 08:19

## 2019-05-30 RX ADMIN — TRAZODONE HYDROCHLORIDE 50 MG: 50 TABLET ORAL at 21:58

## 2019-05-30 RX ADMIN — INSULIN LISPRO 5 UNITS: 100 INJECTION, SOLUTION INTRAVENOUS; SUBCUTANEOUS at 17:03

## 2019-05-30 RX ADMIN — THERA TABS 1 TABLET: TAB at 08:20

## 2019-05-30 RX ADMIN — CITALOPRAM HYDROBROMIDE 20 MG: 20 TABLET ORAL at 13:58

## 2019-05-30 RX ADMIN — CLONIDINE HYDROCHLORIDE 0.3 MG: 0.1 TABLET ORAL at 08:20

## 2019-05-30 RX ADMIN — INSULIN LISPRO 2 UNITS: 100 INJECTION, SOLUTION INTRAVENOUS; SUBCUTANEOUS at 17:03

## 2019-05-30 RX ADMIN — LACTULOSE 15 ML: 20 SOLUTION ORAL at 17:02

## 2019-05-30 RX ADMIN — HYDROXYZINE HYDROCHLORIDE 50 MG: 50 TABLET, FILM COATED ORAL at 21:01

## 2019-05-30 RX ADMIN — PANTOPRAZOLE SODIUM 40 MG: 40 TABLET, DELAYED RELEASE ORAL at 08:20

## 2019-05-30 RX ADMIN — INSULIN GLARGINE 20 UNITS: 100 INJECTION, SOLUTION SUBCUTANEOUS at 08:23

## 2019-05-30 NOTE — INTERDISCIPLINARY ROUNDS
Behavioral Health Interdisciplinary Rounds Patient Name: Guy Vo  Age: 44 y.o. Room/Bed:  728/ Primary Diagnosis: <principal problem not specified> Admission Status: Voluntary Readmission within 30 days: no 
Power of  in place: no 
Patient requires a blocked bed: no          Reason for blocked bed: VTE Prophylaxis: No 
 
Mobility needs/Fall risk: no 
Flu Vaccine : no  
Nutritional Plan: no 
Consults:       
Labs/Testing due today?: yes Sleep hours:       
Participation in Care/Groups: New admit Medication Compliant?: Yes PRNS (last 24 hours): Antipsychotic (PO), Antianxiety, Sleep Aid and Pain Restraints (last 24 hours):  no 
  
CIWA (range last 24 hours): CIWA-Ar Total: 0  
COWS (range last 24 hours): Alcohol screening (AUDIT) completed -   AUDIT Score: 8 If applicable, date SBIRT discussed in treatment team AND documented:  
AUDIT Screen Score: AUDIT Score: 8 Document Brief Intervention (corresponds directly with the 5 A's, Ask, Advise, Assess, Assist, and Arrange): At- Risk Patients (Score 7-15 for women; 8-15 for men) Discuss concern patient is drinking at unhealthy levels known to increase risk of alcohol-related health problems. Is Patient ready to commit to change? If No: 
? Encourage reflection ? Discuss short term and long term health risks of consuming alcohol ? Barriers to change ? Reaffirm willingness to help / Educational materials provided If Yes: 
? Set goal 
? Plan 
? Educational materials provided Harmful use or Dependence (Score 16 or greater) ? Discuss short term and long term health risks of consuming alcohol ? Recommendations ? Negotiate drinking goal 
? Recommend addiction specialist/center ? Arrange follow-up appointments. Tobacco - patient is a smoker: Have You Used Tobacco in the Past 30 Days: Yes Illegal Drugs use: Have You Used Any Illegal Substances Over the Past 12 Months: Yes 
 
 24 hour chart check complete: yes Patient goal(s) for today:  
Treatment team focus/goals:  
Progress note LOS:  1  Expected LOS:  
 
Financial concerns/prescription coverage:   
Date of last family contact:   
Family requesting physician contact today:   
Discharge plan:  
Guns in the home: Outpatient provider(s):  
 
Participating treatment team members: Loan Royal, * (assigned SW),

## 2019-05-30 NOTE — BH NOTES
PSYCHOSOCIAL ASSESSMENT  :Patient identifying info:  Alicia Zuluaga is a 44 y.o., male admitted 2019  6:51 PM     Presenting problem and precipitating factors: He was transferred for the medical floor due to suicidal and homicidal ideations. Mental status assessment:alert and oriented X's 3, still having suicidal ideations ,   Strengths: he is able to ask for help- wiling to take medications     Collateral information: no support      PO - [phone]   Current psychiatric /substance abuse providers and contact info: no current providers     Previous psychiatric/substance abuse providers and response to treatment: just got out of [facility]     Family history of mental illness or substance abuse: unknown     Substance abuse history:    Social History     Tobacco Use    Smoking status: Current Every Day Smoker     Packs/day: 1.00     Years: 20.00     Pack years: 20.00    Smokeless tobacco: Never Used   Substance Use Topics    Alcohol use: Yes     Comment: drink about 1 gallon daily wine       History of biomedical complications associated with substance abuse :none noted     Patient's current acceptance of treatment or motivation for change:poor     Family constellation: single , no children     Is significant other involved?        Describe support system:     Describe living arrangements and home environment:homeless     Health issues:   Hospital Problems  Date Reviewed: 2019          Codes Class Noted POA    Bipolar 1 disorder, depressed ([facility].) ICD-10-CM: F31.9  ICD-9-CM: 296.50  2019 Unknown              Trauma history: none noted     Legal issues: no    History of  service: no    Financial status: SSDI     Judaism/cultural factors: none noted     Education/work history: high school education   Have you been licensed as a health care professional (current or ): no    Leisure and recreation preferences: unknown     Describe coping skills:ineffectual     Be[employer] Espinoza  5/30/2019

## 2019-05-30 NOTE — PROGRESS NOTES
Problem: Alcohol Withdrawal  Goal met by 6/14/2019 Goal: *STG: Participates in treatment plan Outcome: Progressing Towards Goal 
Note:  
Pt. Met in treatment team   anxious   labile Problem: Diabetes Self-Management Goal: *Disease process and treatment process Description Define diabetes and identify own type of diabetes; list 3 options for treating diabetes. Outcome: Progressing Towards Goal 
  
Problem: Alcohol Withdrawal 
Goal: *STG: Complies with medication therapy Outcome: Progressing Towards Goal 
Note:  
Medication compliant   reviewed in treatment team 
  
Problem: Suicide/Homicide (Adult/Pediatric) Goal: *STG: Remains safe in hospital 
Outcome: Progressing Towards Goal 
Note:  
Denies suicidal ideation Problem: Alcohol Withdrawal 
Goal: *STG: Seeks staff when symptoms of withdrawal increase Outcome: Not Progressing Towards Goal 
Note: CIWA 1     elevated /110 Problem: Alcohol Withdrawal 
Goal: *STG: Attends activities and groups Outcome: Not Progressing Towards Goal 
Note: Out on unit for meals only   not attending groups Problem: Depressed Mood (Adult/Pediatric) Goal: *STG: Participates in treatment plan Outcome: Not Progressing Towards Goal 
Note:  
Pt. States he is still depressed     wanting to hurt people around him Problem: Falls - Risk of 
Goal: *Absence of Falls Description Document Citlali Mazariegos Fall Risk and appropriate interventions in the flowsheet. Outcome: Progressing Towards Goal 
Note:  
Fall Risk Interventions: non slip socks  bed in low position Mobility Interventions: Assess mobility with egress test 
 
Mentation Interventions: Adequate sleep, hydration, pain control History of Falls Interventions: Door open when patient unattended Problem: Depressed Mood (Adult/Pediatric) Goal: Interventions Outcome: Not Progressing Towards Goal 
Note:  
Will continue to monitor  on 15 min.  Checks for safety  assess depression lability   WA medication compliance  effectiveness  encourage unit participation [facility] [address] Date Treatment Plan Initiated: 5/30/2019 Treatment Plan Modalities: 
 
Type of Modality Amount (x minutes) Frequency (x/week) Duration (x days) Name of Responsible Staff Community & wrap-up meetings to encourage peer interactions   
15   
7   
1   
 Neila Denver , BHT Group psychotherapy to assist in building coping skills and internal controls   
60   
7   
1   
Ariel Betancourt LCSW Therapeutic activity groups to build coping skills   
60   
7   
1   
Ariel Betancourt LCSW Psychoeducation in group setting to address:  
Medication education   
15   
7   
1   
Hannah Espinoza RN Coping skills   
20   
7   
1   
Nabil Glass RN Relaxation techniques          
Symptom management          
Discharge planning   
15   
7   
1   
Ceci Leos ,  Spirituality    
60   
7   
1   
Chaplain Gudino Self LEBRON   
60   
7   
1   
Volunteer from [facility]NA   
60   
7   
1   
Volunteer from [facility] Physician medication management   
15   
7   
1   
Dr. Kemal Vo Family meeting/discharge planning

## 2019-05-30 NOTE — PROGRESS NOTES
Resting in bed with eyes closed, no complaints, no distress noted. Safety measures in place, will continue to monitor.

## 2019-05-30 NOTE — BH NOTES
Primary Nurse Colt Brown RN and Krystyna Flores RN performed a dual skin assessment on this patient No impairment noted Anuj score is 23 Pt has multiple tattoos all over body 12 inch scar mid abdominal area Pt appears angry and slightly irritable during admission process. Uninterested and occasionally answers admission questions while at times ignoring staff. Denies si but admits to having thoughts all the time towards harming others depending on mood and current surrounding situation. Pressure Injury Documentation 
(COMPLETE ONE LABEL PER PRESSURE INJURY) For further information, please review corresponding Wound Care flowsheet. Manav Kelli has: No pressure injury noted and pressure ulcer prevention initiated.  
 
 
 
 
Colt Brown, RN

## 2019-05-30 NOTE — PROGRESS NOTES
Problem: Diabetes Self-Management  Goal: *Disease process and treatment process  Description  Define diabetes and identify own type of diabetes; list 3 options for treating diabetes. Outcome: Progressing Towards Goal  Goal: *Incorporating nutritional management into lifestyle  Description  Describe effect of type, amount and timing of food on blood glucose; list 3 methods for planning meals. Outcome: Progressing Towards Goal  Goal: *Incorporating physical activity into lifestyle  Description  State effect of exercise on blood glucose levels. Outcome: Progressing Towards Goal  Goal: *Developing strategies to promote health/change behavior  Description  Define the ABC's of diabetes; identify appropriate screenings, schedule and personal plan for screenings. Outcome: Progressing Towards Goal  Goal: *Using medications safely  Description  State effect of diabetes medications on diabetes; name diabetes medication taking, action and side effects. Outcome: Progressing Towards Goal  Goal: *Monitoring blood glucose, interpreting and using results  Description  Identify recommended blood glucose targets  and personal targets. Outcome: Progressing Towards Goal  Goal: *Prevention, detection, treatment of acute complications  Description  List symptoms of hyper- and hypoglycemia; describe how to treat low blood sugar and actions for lowering  high blood glucose level. Outcome: Progressing Towards Goal  Goal: *Prevention, detection and treatment of chronic complications  Description  Define the natural course of diabetes and describe the relationship of blood glucose levels to long term complications of diabetes. Outcome: Progressing Towards Goal  Goal: *Developing strategies to address psychosocial issues  Description  Describe feelings about living with diabetes; identify support needed and support network  Outcome: Progressing Towards Goal   Will continue accu check finger sticks as ordered.   Pt education continues regarding diabetic teaching

## 2019-05-30 NOTE — PROGRESS NOTES
Problem: Discharge Planning  Goal: *Knowledge of medication management  Outcome: Progressing Towards Goal  Note:   Willing to take medications      Problem: Discharge Planning  Goal: *Discharge to safe environment  Outcome: Not Progressing Towards Goal  Note:   He is homeless  No support   No established out patient   Goal: *Knowledge of discharge instructions  Outcome: Not Progressing Towards Goal  Note:   Unable to verbalize discharge instructions

## 2019-05-30 NOTE — DIABETES MGMT
DTC Consult Note Follow Up Recommendations/ Comments: Chart reviewed on [facility] Ed for hospital glucose management/ hyperglycemia. FBG today 240 mg/dl Noted Lantus increased to 20 units starting this morning and lispro 5 units AC added. DTC will see paient once appropriate. Current hospital DM medication: Lispro correction, normal sensitivity, Lantus 16 units Patient is a 44 y.o. male with known diabetes, no home medications listed on PTA. Pt transferred to Behavioral health 5/30/2019 A1c:  
Lab Results Component Value Date/Time Hemoglobin A1c 11.0 (H) 05/25/2019 07:20 PM  
 Hemoglobin A1c 6.4 (H) 08/05/2013 06:00 AM  
 
 
Recent Glucose Results:  
Lab Results Component Value Date/Time GLUCPOC 240 (H) 05/30/2019 07:37 AM  
 GLUCPOC 205 (H) 05/29/2019 08:16 PM  
  
 
Lab Results Component Value Date/Time Creatinine 1.01 05/28/2019 03:04 AM  
 
Estimated Creatinine Clearance: 91.3 mL/min (based on SCr of 1.01 mg/dL). Active Orders Diet DIET DIABETIC CONSISTENT CARB Regular PO intake:  
No data found. Will continue to follow as needed. Thank you Jerry Henderson RN, CDE Pager [phone] Time spent: 4 minutes

## 2019-05-30 NOTE — INTERDISCIPLINARY ROUNDS
Behavioral Health Interdisciplinary Rounds Patient Name: Leela Cramer  Age: 44 y.o. Room/Bed:  728/ Primary Diagnosis: <principal problem not specified> Admission Status: Voluntary Readmission within 30 days: no 
Power of  in place: no 
Patient requires a blocked bed: no          Reason for blocked bed: VTE Prophylaxis: No 
 
Mobility needs/Fall risk: no 
Flu Vaccine : no  
Nutritional Plan: no 
Consults:       
Labs/Testing due today?: yes Sleep hours:  7.45 Participation in Care/Groups: New admit Medication Compliant?: Yes PRNS (last 24 hours): Antipsychotic (PO), Antianxiety, Sleep Aid and Pain Restraints (last 24 hours):  no 
  
CIWA (range last 24 hours): CIWA-Ar Total: 0  
COWS (range last 24 hours): Alcohol screening (AUDIT) completed -   AUDIT Score: 8 If applicable, date SBIRT discussed in treatment team AND documented:  
AUDIT Screen Score: AUDIT Score: 8 Document Brief Intervention (corresponds directly with the 5 A's, Ask, Advise, Assess, Assist, and Arrange): At- Risk Patients (Score 7-15 for women; 8-15 for men) Discuss concern patient is drinking at unhealthy levels known to increase risk of alcohol-related health problems. Is Patient ready to commit to change? Chelsi Levi If Yes: 
? Set goal- stop drinking ? Plan- refer to the  [facility] [address]  
? Educational materials provided 
? nts. Tobacco - patient is a smoker: Have You Used Tobacco in the Past 30 Days: Yes Illegal Drugs use: Have You Used Any Illegal Substances Over the Past 12 Months: Yes 
 
24 hour chart check complete: yes Patient goal(s) for today:  
Treatment team focus/goals: Pan to assess for medications and discharge need. Progress note : he remains hopeless and helpless . Still suicidal due to homelessness. LOS:  1  Expected LOS:TBD Financial concerns/prescription coverage:  Medicaid Date of last family contact: Family requesting physician contact today:   
Discharge plan: refer to the [facility] [address] Guns in the home:  no Outpatient provider(s): refer to the [facility] Participating treatment team members: BOBBY Lynne Dr., RN

## 2019-05-30 NOTE — BH NOTES
PRN Medication Documentation    Specific patient behavior that led to need for PRN medication: irritable,agitated. h/i Staff interventions attempted prior to PRN being given: redirection PRN medication given: zyprexa  Patient response/effectiveness of PRN medication: tl aware,good

## 2019-05-31 LAB
GLUCOSE BLD STRIP.AUTO-MCNC: 202 MG/DL (ref 65–100)
GLUCOSE BLD STRIP.AUTO-MCNC: 246 MG/DL (ref 65–100)
GLUCOSE BLD STRIP.AUTO-MCNC: 342 MG/DL (ref 65–100)
GLUCOSE BLD STRIP.AUTO-MCNC: 348 MG/DL (ref 65–100)
GLUCOSE BLD STRIP.AUTO-MCNC: 395 MG/DL (ref 65–100)
SERVICE CMNT-IMP: ABNORMAL

## 2019-05-31 PROCEDURE — 74011250637 HC RX REV CODE- 250/637: Performed by: PSYCHIATRY & NEUROLOGY

## 2019-05-31 PROCEDURE — 74011636637 HC RX REV CODE- 636/637: Performed by: HOSPITALIST

## 2019-05-31 PROCEDURE — 65220000003 HC RM SEMIPRIVATE PSYCH

## 2019-05-31 PROCEDURE — 82962 GLUCOSE BLOOD TEST: CPT

## 2019-05-31 PROCEDURE — 74011250637 HC RX REV CODE- 250/637: Performed by: HOSPITALIST

## 2019-05-31 PROCEDURE — 74011636637 HC RX REV CODE- 636/637: Performed by: NURSE PRACTITIONER

## 2019-05-31 RX ORDER — AMLODIPINE BESYLATE 5 MG/1
10 TABLET ORAL DAILY
Status: DISCONTINUED | OUTPATIENT
Start: 2019-05-31 | End: 2019-06-03 | Stop reason: HOSPADM

## 2019-05-31 RX ORDER — TAMSULOSIN HYDROCHLORIDE 0.4 MG/1
0.4 CAPSULE ORAL DAILY
Status: ON HOLD | COMMUNITY
End: 2019-06-03 | Stop reason: SDUPTHER

## 2019-05-31 RX ORDER — INSULIN LISPRO 100 [IU]/ML
6 INJECTION, SOLUTION INTRAVENOUS; SUBCUTANEOUS ONCE
Status: COMPLETED | OUTPATIENT
Start: 2019-05-31 | End: 2019-05-31

## 2019-05-31 RX ORDER — NAPROXEN 250 MG/1
500 TABLET ORAL
Status: DISCONTINUED | OUTPATIENT
Start: 2019-05-31 | End: 2019-06-01

## 2019-05-31 RX ORDER — DULOXETIN HYDROCHLORIDE 60 MG/1
60 CAPSULE, DELAYED RELEASE ORAL DAILY
COMMUNITY
End: 2019-06-03

## 2019-05-31 RX ORDER — FUROSEMIDE 20 MG/1
20 TABLET ORAL DAILY
COMMUNITY
End: 2019-06-03

## 2019-05-31 RX ORDER — LISINOPRIL 40 MG/1
40 TABLET ORAL DAILY
Status: ON HOLD | COMMUNITY
End: 2019-06-03 | Stop reason: SDUPTHER

## 2019-05-31 RX ADMIN — INSULIN LISPRO 7 UNITS: 100 INJECTION, SOLUTION INTRAVENOUS; SUBCUTANEOUS at 11:53

## 2019-05-31 RX ADMIN — Medication 100 MG: at 08:21

## 2019-05-31 RX ADMIN — INSULIN LISPRO 6 UNITS: 100 INJECTION, SOLUTION INTRAVENOUS; SUBCUTANEOUS at 21:08

## 2019-05-31 RX ADMIN — INSULIN LISPRO 5 UNITS: 100 INJECTION, SOLUTION INTRAVENOUS; SUBCUTANEOUS at 07:46

## 2019-05-31 RX ADMIN — PANTOPRAZOLE SODIUM 40 MG: 40 TABLET, DELAYED RELEASE ORAL at 08:22

## 2019-05-31 RX ADMIN — INSULIN LISPRO 3 UNITS: 100 INJECTION, SOLUTION INTRAVENOUS; SUBCUTANEOUS at 17:16

## 2019-05-31 RX ADMIN — LACTULOSE 15 ML: 20 SOLUTION ORAL at 08:22

## 2019-05-31 RX ADMIN — THERA TABS 1 TABLET: TAB at 08:21

## 2019-05-31 RX ADMIN — NAPROXEN 500 MG: 250 TABLET ORAL at 12:45

## 2019-05-31 RX ADMIN — AMLODIPINE BESYLATE 10 MG: 5 TABLET ORAL at 12:43

## 2019-05-31 RX ADMIN — INSULIN GLARGINE 20 UNITS: 100 INJECTION, SOLUTION SUBCUTANEOUS at 08:02

## 2019-05-31 RX ADMIN — INSULIN LISPRO 5 UNITS: 100 INJECTION, SOLUTION INTRAVENOUS; SUBCUTANEOUS at 07:47

## 2019-05-31 RX ADMIN — CLONIDINE HYDROCHLORIDE 0.3 MG: 0.1 TABLET ORAL at 17:13

## 2019-05-31 RX ADMIN — CITALOPRAM HYDROBROMIDE 20 MG: 20 TABLET ORAL at 08:22

## 2019-05-31 RX ADMIN — INSULIN LISPRO 5 UNITS: 100 INJECTION, SOLUTION INTRAVENOUS; SUBCUTANEOUS at 17:16

## 2019-05-31 RX ADMIN — LACTULOSE 15 ML: 20 SOLUTION ORAL at 16:30

## 2019-05-31 RX ADMIN — FOLIC ACID 1 MG: 1 TABLET ORAL at 08:21

## 2019-05-31 RX ADMIN — INSULIN LISPRO 5 UNITS: 100 INJECTION, SOLUTION INTRAVENOUS; SUBCUTANEOUS at 11:53

## 2019-05-31 RX ADMIN — CLONIDINE HYDROCHLORIDE 0.3 MG: 0.1 TABLET ORAL at 08:21

## 2019-05-31 RX ADMIN — HYDROXYZINE HYDROCHLORIDE 50 MG: 50 TABLET, FILM COATED ORAL at 08:23

## 2019-05-31 RX ADMIN — TRAZODONE HYDROCHLORIDE 50 MG: 50 TABLET ORAL at 21:07

## 2019-05-31 RX ADMIN — HYDROXYZINE HYDROCHLORIDE 50 MG: 50 TABLET, FILM COATED ORAL at 21:10

## 2019-05-31 NOTE — BH NOTES
3106- PRN Medication Documentation    Specific patient behavior that led to need for PRN medication: anxiety, generalized muscle tension, irritable, elevated BP with c/o headache refuses tylenol or motrin   Staff interventions attempted prior to PRN being given: education, rest, coping skills  PRN medication given: po 50 mg Atarax   Patient response/effectiveness of PRN medication: pt reduced anxiety and tension

## 2019-05-31 NOTE — DIABETES MGMT
DTC Consult Note  Follow Up    Recommendations/ Comments: Chart reviewed on Hallie Ann for hospital glucose management/ hyperglycemia. BG's ranging 248 mg/dL - 348 mg/dL in spite of addition of lispro AC and increase in Lantus. He received 12 units of lispro correction yesterday    If appropriate please consider  Increase Lantus to 25 units daily  Increase mealtime AC to 10 units      Current hospital DM medication:   Lispro correction, normal sensitivity,   Lantus 20 units each AM  Lispro 5 units AC    Patient is a 44 y.o. male with known diabetes, no home medications listed on PTA. Pt transferred to Behavioral health 5/30/2019  Please re-consult DTC when pt ready for assessment/education      A1c:   Lab Results   Component Value Date/Time    Hemoglobin A1c 11.0 (H) 05/25/2019 07:20 PM    Hemoglobin A1c 6.4 (H) 08/05/2013 06:00 AM       Recent Glucose Results:   Lab Results   Component Value Date/Time    GLUCPOC 348 (H) 05/31/2019 11:29 AM    GLUCPOC 246 (H) 05/31/2019 07:27 AM    GLUCPOC 245 (H) 05/30/2019 08:49 PM        Lab Results   Component Value Date/Time    Creatinine 1.01 05/28/2019 03:04 AM     Estimated Creatinine Clearance: 91.3 mL/min (based on SCr of 1.01 mg/dL). Active Orders   Diet    DIET DIABETIC CONSISTENT CARB Regular        PO intake:   No data found. Will continue to follow as needed.     Thank you  Tim Torres RN, CDE  Pager [phone]    Time spent: 4 minutes

## 2019-05-31 NOTE — BH NOTES
Chief Complaint:  I am hungry all the time    Interval History:  Mr. Preet Arboleda is little changed. He has not been compliant with a diabetic diet and demands to be given extra food. Remains irritable and minimally involved with the milieu and groups. Says his mood is \"still very low\". He has a possible phone interview with [facility]. Remains motivated to get into rehab. Past Medical History:  Past Medical History:   Diagnosis Date    Bipolar 1 disorder ([facility]     Diabetes ([facility]     Hep C w/o coma, chronic ([facility].)     Hypertension     Liver cirrhosis, alcoholic ([facility].)     Psychiatric disorder     depression, homicidal, suicidal           Labs:  Lab Results   Component Value Date/Time    WBC 4.0 (L) 05/28/2019 03:04 AM    Hemoglobin, POC 12.2 (L) 02/11/2011 09:41 AM    HGB 10.4 (L) 05/28/2019 03:04 AM    Hematocrit, POC 36 (L) 02/11/2011 09:41 AM    HCT 34.8 (L) 05/28/2019 03:04 AM    PLATELET 94 (L) 74/00/9278 03:04 AM    MCV 88.1 05/28/2019 03:04 AM      Lab Results   Component Value Date/Time    Sodium 138 05/28/2019 03:04 AM    Potassium 3.5 05/28/2019 03:04 AM    Chloride 107 05/28/2019 03:04 AM    CO2 23 05/28/2019 03:04 AM    Anion gap 8 05/28/2019 03:04 AM    Glucose 294 (H) 05/28/2019 03:04 AM    BUN 20 05/28/2019 03:04 AM    Creatinine 1.01 05/28/2019 03:04 AM    BUN/Creatinine ratio 20 05/28/2019 03:04 AM    GFR est AA >60 05/28/2019 03:04 AM    GFR est non-AA >60 05/28/2019 03:04 AM    Calcium 9.1 05/28/2019 03:04 AM    Bilirubin, total 0.8 05/28/2019 03:04 AM    AST (SGOT) 25 05/28/2019 03:04 AM    Alk.  phosphatase 106 05/28/2019 03:04 AM    Protein, total 7.2 05/28/2019 03:04 AM    Albumin 2.5 (L) 05/28/2019 03:04 AM    Globulin 4.7 (H) 05/28/2019 03:04 AM    A-G Ratio 0.5 (L) 05/28/2019 03:04 AM    ALT (SGPT) 27 05/28/2019 03:04 AM      Vitals:    05/30/19 1623 05/30/19 1907 05/31/19 0819 05/31/19 0946   BP: (!) 155/104 (!) 148/96 (!) 162/117 131/89   Pulse: 80 81 81 90   Resp:  18 16 16   Temp: 98.1 °F (36.7 °C) 98.3 °F (36.8 °C) 98 °F (36.7 °C) 98 °F (36.7 °C)   SpO2:  98% 98% 98%   Weight:       Height:               Mental Status Exam:  Eye contact: decreased  Psychomotor activity: WNL  Speech is spontaneous  Mood is \"frustrated\"  Affect: constricted  Perception: Denies any AH or VH. Suicidal ideation: Denies   Cognition is grossly intact      Physical Exam:  Body habitus: obese  Musculoskeletal system: normal gait  Tremor is not present  Cog wheeling is not present. Assessment and Plan:  Shaji Munson meets criteria for a diagnosis of Mood Disorder NOS  Added Norvasc to his regimen for blood pressure control   Continue the medication regimen as prescribed  Disposition planning to continue. I certify that this patients inpatient psychiatric hospital services furnished since the previous certification were, and continue to be, required for treatment that could reasonably be expected to improve the patient's condition, or for diagnostic study, and that the patient continues to need, on a daily basis, active treatment furnished directly by or requiring the supervision of inpatient psychiatric facility personnel. In addition, the hospital records show that services furnished were intensive treatment services, admission or related services, or equivalent services.

## 2019-05-31 NOTE — BH NOTES
GROUP THERAPY PROGRESS NOTE    Lane Anna is participating in Reflections. Group time: 20 minutes    Personal goal for participation: unit orientation and daily progress    Goal orientation: personal    Group therapy participation: active    Therapeutic interventions reviewed and discussed: \"Are you under stress? \" and \"Nutrition\" word search handouts. Impression of participation: Arrived to group late. Seemed irritable as sat facing away from  and peers. Complained of not getting enough to eat. After group, informed this staff that he had recently broken up with girlfriend and had discussed this with other staff.

## 2019-05-31 NOTE — BH NOTES
GROUP THERAPY PROGRESS NOTE    Rosa Currie did not participate in a 50 minute Acute Unit Process Group, with a focus identifying feelings, planning for the rest of the day, and discussing discharge.

## 2019-05-31 NOTE — BH NOTES
Care Management Note:    SW spoke to [facility] : The program is full at this time , but patient can go to the shelter until a bed opens up. Plan for discharge on Monday.

## 2019-05-31 NOTE — INTERDISCIPLINARY ROUNDS
Behavioral Health Interdisciplinary Rounds     Patient Name: Aniyah Escoto  Age: 44 y.o. Room/Bed:  728/  Primary Diagnosis: <principal problem not specified>   Admission Status: Voluntary     Readmission within 30 days: no  Power of  in place: no  Patient requires a blocked bed: no          Reason for blocked bed:     VTE Prophylaxis: No    Mobility needs/Fall risk: no  Flu Vaccine : no   Nutritional Plan: no  Consults:          Labs/Testing due today?: yes    Sleep hours:  7.0      Participation in Care/Groups:  no  Medication Compliant?: Yes  PRNS (last 24 hours): Antipsychotic (PO), Antianxiety and Sleep Aid    Restraints (last 24 hours):  no     CIWA (range last 24 hours): CIWA-Ar Total: 0   COWS (range last 24 hours):      Alcohol screening (AUDIT) completed -   AUDIT Score: 8     If applicable, date SBIRT discussed in treatment team AND documented:   AUDIT Screen Score: AUDIT Score: 8      Document Brief Intervention (corresponds directly with the 5 A's, Ask, Advise, Assess, Assist, and Arrange): At- Risk Patients (Score 7-15 for women; 8-15 for men)  Discuss concern patient is drinking at unhealthy levels known to increase risk of alcohol-related health problems. Is Patient ready to commit to change? Yes,    If Yes:   Set goal- stop using    Plan- plan to stop drinking ETOH    Educational materials provided      Tobacco - patient is a smoker: Have You Used Tobacco in the Past 30 Days: Yes  Illegal Drugs use: Have You Used Any Illegal Substances Over the Past 12 Months: Yes    24 hour chart check complete: yes     Patient goal(s) for today:   Treatment team focus/goals: Plan to contact the [facility] about going to the program.   Progress note : he has remains irritable and refused lab work this morning.   He continues to have increased BS     LOS:  2  Expected LOS: TBD    Financial concerns/prescription coverage: medicaid  Medicaid   Date of last family contact:      Family requesting physician contact today:    Discharge plan: homeless  Guns in the home: no      Outpatient provider(s): refer to [facility] or [facility]     Participating treatment team members: Norberta Shih, Francene Evens Dr. Christy Opitz, RN

## 2019-05-31 NOTE — PROGRESS NOTES
Admission Medication Reconciliation:    Information obtained from:  Patient interview / Communication with [facility] / Al[facility] ([facility] available) [facility]     Comments/Recommendations: Updated PTA meds/reviewed patient's allergies. 1)  Patient reports taking \"blood pressure medications\" but was unable to recall the names. He reports using [facility]. Pharmacist called [address] [place] [address] to confirm medications. Both furosemide and duloxetine are on hold at the pharmacy (never filled up) and lisinopril was last filled in 2/2019.     2)  Medication changes (since last review): Added  - lisinopril 40mg daily - last filled in 2/2019 - unclear compliance  - tamsulosin 0.4mg daily  - furosemide 20mg daily - never picked up, on hold at [facility]  - duloxetine 60mg daily - never picked up, on hold at [facility]  - Humulin N - 60 units twice daily at 9am and 9pm - most recent fill on 5/23/19 has not      been picked up yet, unclear compliance    Removed  - folic acid  - metoclopramide  - oxycodone/APAP  - tramadol    3)  The [facility] () was assessed to determine fill history of any controlled medications. The patient has NOT filled a controlled medication in last 2 years. Allergies:  Patient has no known allergies. Significant PMH/Disease States:   Past Medical History:   Diagnosis Date    Bipolar 1 disorder ([facility]     Diabetes ([facility]     Hep C w/o coma, chronic (HCC)     Hypertension     Liver cirrhosis, alcoholic ([facility]     Psychiatric disorder     depression, homicidal, suicidal     Chief Complaint for this Admission:  No chief complaint on file. Prior to Admission Medications:   Prior to Admission Medications   Prescriptions Last Dose Informant Patient Reported? Taking? DULoxetine (CYMBALTA) 60 mg capsule Not Taking at Unknown time  Yes No   Sig: Take 60 mg by mouth daily.    cloNIDine HCl (CATAPRES) 0.3 mg tablet   Yes No   Sig: Take 0.3 mg by mouth two (2) times a day. Indications: high blood pressure   furosemide (LASIX) 20 mg tablet Not Taking at Unknown time  Yes No   Sig: Take 20 mg by mouth daily. insulin NPH (HUMULIN N NPH U-100 INSULIN) 100 unit/mL injection Unknown at Unknown time  Yes No   Si Units by SubCUTAneous route two (2) times a day. At 9:00 AM and 9:00 PM   Indications: type 2 diabetes mellitus   lisinopril (PRINIVIL, ZESTRIL) 40 mg tablet Not Taking at Unknown time  Yes No   Sig: Take 40 mg by mouth daily. Indications: high blood pressure   tamsulosin (FLOMAX) 0.4 mg capsule Unknown at Unknown time  Yes No   Sig: Take 0.4 mg by mouth daily.  Indications: enlarged prostate with urination problem      Facility-Administered Medications: None     Gretchen Nolen, PharmD, BCPP, [facility] Specialist, [address] [place] [address]

## 2019-05-31 NOTE — H&P
[address] [facility] HISTORY AND PHYSICAL    Name:  Anton Lema  MR#:  [id number]  :  1970  ACCOUNT #:  [de-identified]  ADMIT DATE:  2019    INITIAL PSYCHIATRIC INTERVIEW    CHIEF COMPLAINT:  \"I am still feeling pretty bad. \"    HISTORY OF PRESENT ILLNESS:  The patient is a 43-year-old [facility] American man who is transferred to us from the medical floor at [address]  He had presented there 3 days ago with a history of drinking heavily and had requested to be detoxed. His detox was completed; although, after reviewing the records, it does not appear that he actually had any withdrawal symptoms. He had been found slumped over in a hotel in the hallway in an intoxicated state. His blood sugar at that time had been 393 and blood alcohol level was 194. States that he had had about 2 pints of alcohol to drink and he drinks that much every other day. Also, was using heroin and says that he uses the point of heroin most days. Currently transferred to us because he reported thoughts of homicide towards people. When I asked him about this, he said that he was angry at the people who had given him drugs because his urine was positive for cocaine and he says he has never used cocaine and thinks that his heroin had been contaminated. He says he has also been having passive thoughts of suicide but was unable to give me any further details. States he has been depressed because he is homeless and his living situation has been very difficult recently. Denies any psychotic symptoms at the present time.     PAST MEDICAL HISTORY:  Reviewed as per the history and physical exam.    Past Medical History:   Diagnosis Date    Bipolar 1 disorder ([address]     [facility]     Hep C w/o coma, chronic ([facility]     Hypertension     Liver cirrhosis, alcoholic ([facility]     Psychiatric disorder     depression, homicidal, suicidal      Prior to Admission medications    Medication Sig Start Date End Date Taking? Authorizing Provider   cloNIDine HCl (CATAPRES) 0.3 mg tablet Take 0.3 mg by mouth two (2) times a day. Indications: high blood pressure    Provider, Historical   oxyCODONE-acetaminophen (PERCOCET 7.5) 7.5-325 mg per tablet Take 1 Tab by mouth every six (6) hours as needed. 8/22/13   Lady Crawford MD   traMADol Fredderick [place]) 50 mg tablet Take 1 Tab by mouth every six (6) hours as needed. 8/22/13   Lady Crawford MD   folic acid (FOLVITE) 1 mg tablet Take 1 Tab by mouth daily. 8/5/13   Brenden Barcenas MD   metoclopramide HCl (REGLAN) 5 mg/mL injection 2 mL by IntraVENous route every six (6) hours as needed. 8/5/13   Anahy Price MD    @[facility]@   Vitals:    05/30/19 1623 05/30/19 1907 05/31/19 0819 05/31/19 0946   BP: (!) 155/104 (!) 148/96 (!) 162/117 131/89   Pulse: 80 81 81 90   Resp:  18 16 16   Temp: 98.1 °F (36.7 °C) 98.3 °F (36.8 °C) 98 °F (36.7 °C) 98 °F (36.7 °C)   SpO2:  98% 98% 98%   Weight:       Height:          Lab Results   Component Value Date/Time    WBC 4.0 (L) 05/28/2019 03:04 AM    Hemoglobin, POC 12.2 (L) 02/11/2011 09:41 AM    HGB 10.4 (L) 05/28/2019 03:04 AM    Hematocrit, POC 36 (L) 02/11/2011 09:41 AM    HCT 34.8 (L) 05/28/2019 03:04 AM    PLATELET 94 (L) 25/10/8878 03:04 AM    MCV 88.1 05/28/2019 03:04 AM     Lab Results   Component Value Date/Time    Sodium 138 05/28/2019 03:04 AM    Potassium 3.5 05/28/2019 03:04 AM    Chloride 107 05/28/2019 03:04 AM    CO2 23 05/28/2019 03:04 AM    Anion gap 8 05/28/2019 03:04 AM    Glucose 294 (H) 05/28/2019 03:04 AM    BUN 20 05/28/2019 03:04 AM    Creatinine 1.01 05/28/2019 03:04 AM    BUN/Creatinine ratio 20 05/28/2019 03:04 AM    GFR est AA >60 05/28/2019 03:04 AM    GFR est non-AA >60 05/28/2019 03:04 AM    Calcium 9.1 05/28/2019 03:04 AM    Bilirubin, total 0.8 05/28/2019 03:04 AM    AST (SGOT) 25 05/28/2019 03:04 AM    Alk.  phosphatase 106 05/28/2019 03:04 AM    Protein, total 7.2 05/28/2019 03:04 AM    Albumin 2.5 (L) 05/28/2019 03:04 AM    Globulin 4.7 (H) 05/28/2019 03:04 AM    A-G Ratio 0.5 (L) 05/28/2019 03:04 AM    ALT (SGPT) 27 05/28/2019 03:04 AM         PAST PSYCHIATRIC HISTORY:  The patient has a longstanding history of alcohol, cocaine, and opiate abuse, but says he has not been using heroin recently. According to notes from the ER, he had been hospitalized at [facility] for alcohol dependence and just released a week ago. He is reluctant to give details about his substance abuse history. PSYCHOSOCIAL HISTORY:  The patient is currently homeless. States that [place] [place] [facility] and is originally from [place]. He has been  twice and is  at the present time. He has 7 children and keeps in occasional touch with some of them. Currently, he is unemployed and does not have an income. The patient was incarcerated for 6-1/2 years for possession and distribution of heroin and was just released from custodial in 01/2019. Currently, he has to report to his  regularly. MENTAL STATUS EXAM:  The patient is a middle-aged [facility] American man who is dressed in hospital apparel. He makes minimal eye contact and sits with his eyes towards the floor. Speech is spontaneous and coherent but slightly reduced in output. Makes little eye contact. His affect is depressed and mood is reported as being down. Denies any active suicidal ideation or plan. Denies any perceptual abnormalities. Denies any delusions. His thought process is logical and goal-directed. Cognitively, he is awake and alert, oriented to time, place, and person. Intelligence is average. Memory is intact and fund of knowledge is adequate. Insight is poor. Judgment is poor. ASSESSMENT AND PLAN/DIAGNOSIS:  Mood disorder unspecified, alcohol and opiate dependence. I will continue his inpatient stay. His withdrawal has been completed and does not need to be detoxed.   He will be provided with support and attend groups. Estimated length of stay is 2-3 days. His strengths include his ability to seek help and support from therapeutic providers.         DENEEN GALVEZ MD      [id number]  D:  05/30/2019 14:00  T:  05/30/2019 14:09  JOB #:  [id number]

## 2019-05-31 NOTE — BH NOTES
GROUP THERAPY PROGRESS NOTE    The patient Derek Arce is participating in [employer]. Group time: 30 minutes    Personal goal for participation: to orient the patient to the unit.     Goal orientation: successful adoption of unit rules    Group therapy participation: active    Therapeutic interventions reviewed and discussed: Yes    Impression of participation:     Amelia Jameson  5/31/2019 10:09 AM

## 2019-05-31 NOTE — BH NOTES
PRN Medication Documentation    Specific patient behavior that led to need for PRN medication: anxiety,irritable Staff interventions attempted prior to PRN being given: redirection  PRN medication given:atarax  Patient response/effectiveness of PRN medication:tl aware,good

## 2019-05-31 NOTE — INTERDISCIPLINARY ROUNDS
Behavioral Health Interdisciplinary Rounds     Patient Name: Yessi Brink  Age: 44 y.o. Room/Bed:  72/  Primary Diagnosis: <principal problem not specified>   Admission Status: Voluntary     Readmission within 30 days: no  Power of  in place: no  Patient requires a blocked bed: no          Reason for blocked bed:     VTE Prophylaxis: No    Mobility needs/Fall risk: no  Flu Vaccine : no   Nutritional Plan: no  Consults:          Labs/Testing due today?: yes    Sleep hours:  5      Participation in Care/Groups:  no  Medication Compliant?: Yes  PRNS (last 24 hours): Antipsychotic (PO), Antianxiety and Sleep Aid    Restraints (last 24 hours):  no     CIWA (range last 24 hours): CIWA-Ar Total: 0   COWS (range last 24 hours):      Alcohol screening (AUDIT) completed -   AUDIT Score: 8     If applicable, date SBIRT discussed in treatment team AND documented:   AUDIT Screen Score: AUDIT Score: 8      Document Brief Intervention (corresponds directly with the 5 A's, Ask, Advise, Assess, Assist, and Arrange): At- Risk Patients (Score 7-15 for women; 8-15 for men)  Discuss concern patient is drinking at unhealthy levels known to increase risk of alcohol-related health problems. Is Patient ready to commit to change? If No:   Encourage reflection   Discuss short term and long term health risks of consuming alcohol   Barriers to change   Reaffirm willingness to help / Educational materials provided  If Yes:   Set goal  [employer] provided    Harmful use or Dependence (Score 16 or greater)   Discuss short term and long term health risks of consuming alcohol   Recommendations   Negotiate drinking goal   Recommend addiction specialist/center   Arrange follow-up appointments.     Tobacco - patient is a smoker: Have You Used Tobacco in the Past 30 Days: Yes  Illegal Drugs use: Have You Used Any Illegal Substances Over the Past 12 Months: Yes    24 hour chart check complete: yes     Patient goal(s) for today:   Treatment team focus/goals:   Progress note     LOS:  2  Expected LOS:     Financial concerns/prescription coverage:    Date of last family contact:      Family requesting physician contact today:    Discharge plan:   Guns in the home:       Outpatient provider(s):     Participating treatment team members: Elizabeth Madrid, * (assigned SW),

## 2019-06-01 LAB
GLUCOSE BLD STRIP.AUTO-MCNC: 243 MG/DL (ref 65–100)
GLUCOSE BLD STRIP.AUTO-MCNC: 305 MG/DL (ref 65–100)
GLUCOSE BLD STRIP.AUTO-MCNC: 328 MG/DL (ref 65–100)
GLUCOSE BLD STRIP.AUTO-MCNC: 331 MG/DL (ref 65–100)
HCV GENTYP SERPL NAA+PROBE: NORMAL
PLEASE NOTE, 550474: NORMAL
SERVICE CMNT-IMP: ABNORMAL

## 2019-06-01 PROCEDURE — 74011250637 HC RX REV CODE- 250/637: Performed by: PSYCHIATRY & NEUROLOGY

## 2019-06-01 PROCEDURE — 74011636637 HC RX REV CODE- 636/637: Performed by: HOSPITALIST

## 2019-06-01 PROCEDURE — 74011250637 HC RX REV CODE- 250/637: Performed by: HOSPITALIST

## 2019-06-01 PROCEDURE — 65220000003 HC RM SEMIPRIVATE PSYCH

## 2019-06-01 PROCEDURE — 74011250637 HC RX REV CODE- 250/637: Performed by: NURSE PRACTITIONER

## 2019-06-01 PROCEDURE — 82962 GLUCOSE BLOOD TEST: CPT

## 2019-06-01 RX ORDER — LISINOPRIL 10 MG/1
40 TABLET ORAL DAILY
Status: DISCONTINUED | OUTPATIENT
Start: 2019-06-02 | End: 2019-06-03 | Stop reason: HOSPADM

## 2019-06-01 RX ORDER — TRAMADOL HYDROCHLORIDE 50 MG/1
50 TABLET ORAL
Status: DISCONTINUED | OUTPATIENT
Start: 2019-06-01 | End: 2019-06-02

## 2019-06-01 RX ADMIN — PANTOPRAZOLE SODIUM 40 MG: 40 TABLET, DELAYED RELEASE ORAL at 07:44

## 2019-06-01 RX ADMIN — INSULIN LISPRO 5 UNITS: 100 INJECTION, SOLUTION INTRAVENOUS; SUBCUTANEOUS at 11:44

## 2019-06-01 RX ADMIN — CLONIDINE HYDROCHLORIDE 0.3 MG: 0.1 TABLET ORAL at 16:24

## 2019-06-01 RX ADMIN — LACTULOSE 15 ML: 20 SOLUTION ORAL at 07:42

## 2019-06-01 RX ADMIN — LACTULOSE 15 ML: 20 SOLUTION ORAL at 16:20

## 2019-06-01 RX ADMIN — TRAMADOL HYDROCHLORIDE 50 MG: 50 TABLET, FILM COATED ORAL at 16:20

## 2019-06-01 RX ADMIN — THERA TABS 1 TABLET: TAB at 07:45

## 2019-06-01 RX ADMIN — TRAZODONE HYDROCHLORIDE 50 MG: 50 TABLET ORAL at 21:34

## 2019-06-01 RX ADMIN — INSULIN GLARGINE 20 UNITS: 100 INJECTION, SOLUTION SUBCUTANEOUS at 07:50

## 2019-06-01 RX ADMIN — INSULIN LISPRO 5 UNITS: 100 INJECTION, SOLUTION INTRAVENOUS; SUBCUTANEOUS at 16:18

## 2019-06-01 RX ADMIN — INSULIN LISPRO 7 UNITS: 100 INJECTION, SOLUTION INTRAVENOUS; SUBCUTANEOUS at 11:44

## 2019-06-01 RX ADMIN — INSULIN LISPRO 3 UNITS: 100 INJECTION, SOLUTION INTRAVENOUS; SUBCUTANEOUS at 07:45

## 2019-06-01 RX ADMIN — INSULIN LISPRO 5 UNITS: 100 INJECTION, SOLUTION INTRAVENOUS; SUBCUTANEOUS at 07:45

## 2019-06-01 RX ADMIN — CLONIDINE HYDROCHLORIDE 0.3 MG: 0.1 TABLET ORAL at 07:44

## 2019-06-01 RX ADMIN — INSULIN LISPRO 7 UNITS: 100 INJECTION, SOLUTION INTRAVENOUS; SUBCUTANEOUS at 16:18

## 2019-06-01 RX ADMIN — AMLODIPINE BESYLATE 10 MG: 5 TABLET ORAL at 07:43

## 2019-06-01 RX ADMIN — Medication 100 MG: at 07:43

## 2019-06-01 RX ADMIN — FOLIC ACID 1 MG: 1 TABLET ORAL at 07:44

## 2019-06-01 RX ADMIN — CITALOPRAM HYDROBROMIDE 20 MG: 20 TABLET ORAL at 07:43

## 2019-06-01 RX ADMIN — INSULIN LISPRO 4 UNITS: 100 INJECTION, SOLUTION INTRAVENOUS; SUBCUTANEOUS at 21:31

## 2019-06-01 NOTE — BH NOTES
PRN Medication Documentation    Specific patient behavior that led to need for PRN medication: pt irritable about hs snack, demanding snacks inappropriate for diabetic diet with elevated BG, pacing hallway, angry facial expression, increasing agitation  Staff interventions attempted prior to PRN being given:decreased stimuli, offered emotional support with patient declined  PRN medication given: trazodone and atarax at 2110  Patient response/effectiveness of PRN medication: 35 minutes post administration patient lay quietly in bed.  NAD

## 2019-06-01 NOTE — BH NOTES
Chief Complaint:  I am hungry all the time    Interval History:  Mr. Rosa Newby is little changed. He has not been compliant with a diabetic diet and demands to be given extra food. Remains irritable and minimally involved with the milieu and groups. Has threatened to leave AMA. Also c/o lower abdominal pain. Past Medical History:  Past Medical History:   Diagnosis Date    [facility]     Diabetes ([facility]     Hep C w/o coma, chronic ([facility]     Hypertension     Liver cirrhosis, alcoholic ([facility]     Psychiatric disorder     depression, homicidal, suicidal           Labs:  Lab Results   Component Value Date/Time    WBC 4.0 (L) 05/28/2019 03:04 AM    Hemoglobin, POC 12.2 (L) 02/11/2011 09:41 AM    HGB 10.4 (L) 05/28/2019 03:04 AM    Hematocrit, POC 36 (L) 02/11/2011 09:41 AM    HCT 34.8 (L) 05/28/2019 03:04 AM    PLATELET 94 (L) 33/29/8956 03:04 AM    MCV 88.1 05/28/2019 03:04 AM      Lab Results   Component Value Date/Time    Sodium 138 05/28/2019 03:04 AM    Potassium 3.5 05/28/2019 03:04 AM    Chloride 107 05/28/2019 03:04 AM    CO2 23 05/28/2019 03:04 AM    Anion gap 8 05/28/2019 03:04 AM    Glucose 294 (H) 05/28/2019 03:04 AM    BUN 20 05/28/2019 03:04 AM    Creatinine 1.01 05/28/2019 03:04 AM    BUN/Creatinine ratio 20 05/28/2019 03:04 AM    GFR est AA >60 05/28/2019 03:04 AM    GFR est non-AA >60 05/28/2019 03:04 AM    Calcium 9.1 05/28/2019 03:04 AM    Bilirubin, total 0.8 05/28/2019 03:04 AM    AST (SGOT) 25 05/28/2019 03:04 AM    Alk.  phosphatase 106 05/28/2019 03:04 AM    Protein, total 7.2 05/28/2019 03:04 AM    Albumin 2.5 (L) 05/28/2019 03:04 AM    Globulin 4.7 (H) 05/28/2019 03:04 AM    A-G Ratio 0.5 (L) 05/28/2019 03:04 AM    ALT (SGPT) 27 05/28/2019 03:04 AM      Vitals:    05/31/19 1242 05/31/19 1634 05/31/19 1943 06/01/19 0729   BP: (!) 155/103 (!) 149/92 127/83 (!) 173/106   Pulse: 69 77 68 83   Resp: 16 16 16 16   Temp: 98.7 °F (37.1 °C) 98.8 °F (37.1 °C) 98.5 °F (36.9 °C) 98.1 °F (36.7 °C)   SpO2:  98% 100% 96%   Weight:       Height:               Mental Status Exam:  Eye contact: decreased  Psychomotor activity: WNL  Speech is spontaneous  Mood is \"frustrated\"  Affect: constricted  Perception: Denies any AH or VH. Suicidal ideation: Denies   Cognition is grossly intact      Physical Exam:  Body habitus: obese  Musculoskeletal system: normal gait  Tremor is not present  Cog wheeling is not present. Assessment and Plan:  Cynthia Cheng meets criteria for a diagnosis of Mood Disorder NOS    Get hospitalist consult. Continue the medication regimen as prescribed  Disposition planning to continue. I certify that this patients inpatient psychiatric hospital services furnished since the previous certification were, and continue to be, required for treatment that could reasonably be expected to improve the patient's condition, or for diagnostic study, and that the patient continues to need, on a daily basis, active treatment furnished directly by or requiring the supervision of inpatient psychiatric facility personnel. In addition, the hospital records show that services furnished were intensive treatment services, admission or related services, or equivalent services.

## 2019-06-01 NOTE — INTERDISCIPLINARY ROUNDS
Behavioral Health Interdisciplinary Rounds     Patient Name: Mally Arrington  Age: 44 y.o. Room/Bed:  728/  Primary Diagnosis: <principal problem not specified>   Admission Status: Voluntary     Readmission within 30 days: no  Power of  in place: no  Patient requires a blocked bed: no          Reason for blocked bed:     VTE Prophylaxis: No    Mobility needs/Fall risk: no  Flu Vaccine : no   Nutritional Plan: no  Consults:          Labs/Testing due today?: yes    Sleep hours: 5.45       Participation in Care/Groups:  yes  Medication Compliant?: Yes  PRNS (last 24 hours): Antianxiety and Sleep Aid    Restraints (last 24 hours):  no     CIWA (range last 24 hours): CIWA-Ar Total: 0   COWS (range last 24 hours):      Alcohol screening (AUDIT) completed -   AUDIT Score: 8     If applicable, date SBIRT discussed in treatment team AND documented:   AUDIT Screen Score: AUDIT Score: 8      Document Brief Intervention (corresponds directly with the 5 A's, Ask, Advise, Assess, Assist, and Arrange): At- Risk Patients (Score 7-15 for women; 8-15 for men)  Discuss concern patient is drinking at unhealthy levels known to increase risk of alcohol-related health problems. Is Patient ready to commit to change? If No:   Encourage reflection   Discuss short term and long term health risks of consuming alcohol   Barriers to change   Reaffirm willingness to help / Educational materials provided  If Yes:   Set goal  [employer] provided    Harmful use or Dependence (Score 16 or greater)   Discuss short term and long term health risks of consuming alcohol   Recommendations   Negotiate drinking goal   Recommend addiction specialist/center   Arrange follow-up appointments.     Tobacco - patient is a smoker: Have You Used Tobacco in the Past 30 Days: Yes  Illegal Drugs use: Have You Used Any Illegal Substances Over the Past 12 Months: Yes    24 hour chart check complete: yes     Patient goal(s) for today:   Treatment team focus/goals:   Progress note     LOS:  3  Expected LOS:     Financial concerns/prescription coverage:    Date of last family contact:     Family requesting physician contact today:    Discharge plan:   Guns in the home:        Outpatient provider(s):     Participating treatment team members: Mayra Zambrano, * (assigned SW),

## 2019-06-01 NOTE — PROGRESS NOTES
Irritable edge, demanding snacks not appropriate for diabetic diet. Easily frustrated. Declined reflections group at .

## 2019-06-01 NOTE — BH NOTES
GROUP THERAPY PROGRESS NOTE    Marie Pedro is participating in [employer].      Group time: 10 minutes    Personal goal for participation: \"speak w \"    Goal orientation: personal    Group therapy participation: active    Therapeutic interventions reviewed and discussed:     Impression of participation:

## 2019-06-01 NOTE — PROGRESS NOTES
Problem: Depressed Mood (Adult/Pediatric)  Goal: *STG: Complies with medication therapy  Outcome: Progressing Towards Goal  Note:   Alert oriented. Irritable. Denies SI/HI or AH. Poor safety awareness. Medication meal compliant. Poor sight regarding DM, food, insulin. Plan to consult with DM treatment team for education and Hospitalist related to BP and pain management. Problem: Depressed Mood (Adult/Pediatric)  Goal: Interventions  Outcome: Progressing Towards Goal  Note:   Education provided. Coping skills encouraged. 1132- Dietary consult called. Voice mail left for call back. 46- Dr Shelbie Valadez paged for Hospitalist consult. Last seen by Dr Shelbie Valadez 5/29/19 (8541 note)  [phone]- per Dr Shelbie Valadez team has signed off; Recall consult. [phone] consult called. Spoke to Jose Raul SILVA. NP will see pt.

## 2019-06-02 LAB
FERRITIN SERPL-MCNC: 15 NG/ML (ref 26–388)
GLUCOSE BLD STRIP.AUTO-MCNC: 243 MG/DL (ref 65–100)
GLUCOSE BLD STRIP.AUTO-MCNC: 266 MG/DL (ref 65–100)
GLUCOSE BLD STRIP.AUTO-MCNC: 317 MG/DL (ref 65–100)
GLUCOSE BLD STRIP.AUTO-MCNC: 386 MG/DL (ref 65–100)
IRON SATN MFR SERPL: 6 % (ref 20–50)
IRON SERPL-MCNC: 24 UG/DL (ref 35–150)
SERVICE CMNT-IMP: ABNORMAL
TIBC SERPL-MCNC: 406 UG/DL (ref 250–450)

## 2019-06-02 PROCEDURE — 65220000003 HC RM SEMIPRIVATE PSYCH

## 2019-06-02 PROCEDURE — 74011250637 HC RX REV CODE- 250/637: Performed by: HOSPITALIST

## 2019-06-02 PROCEDURE — 74011250637 HC RX REV CODE- 250/637: Performed by: PSYCHIATRY & NEUROLOGY

## 2019-06-02 PROCEDURE — 83540 ASSAY OF IRON: CPT

## 2019-06-02 PROCEDURE — 74011636637 HC RX REV CODE- 636/637: Performed by: HOSPITALIST

## 2019-06-02 PROCEDURE — 74011636637 HC RX REV CODE- 636/637: Performed by: NURSE PRACTITIONER

## 2019-06-02 PROCEDURE — 74011250637 HC RX REV CODE- 250/637: Performed by: NURSE PRACTITIONER

## 2019-06-02 PROCEDURE — 82728 ASSAY OF FERRITIN: CPT

## 2019-06-02 PROCEDURE — 36415 COLL VENOUS BLD VENIPUNCTURE: CPT

## 2019-06-02 PROCEDURE — 82962 GLUCOSE BLOOD TEST: CPT

## 2019-06-02 RX ORDER — TRAMADOL HYDROCHLORIDE 50 MG/1
50 TABLET ORAL
Status: DISCONTINUED | OUTPATIENT
Start: 2019-06-02 | End: 2019-06-03 | Stop reason: HOSPADM

## 2019-06-02 RX ORDER — INSULIN LISPRO 100 [IU]/ML
8 INJECTION, SOLUTION INTRAVENOUS; SUBCUTANEOUS
Status: DISCONTINUED | OUTPATIENT
Start: 2019-06-02 | End: 2019-06-03 | Stop reason: HOSPADM

## 2019-06-02 RX ORDER — INSULIN LISPRO 100 [IU]/ML
10 INJECTION, SOLUTION INTRAVENOUS; SUBCUTANEOUS ONCE
Status: COMPLETED | OUTPATIENT
Start: 2019-06-02 | End: 2019-06-02

## 2019-06-02 RX ORDER — LANOLIN ALCOHOL/MO/W.PET/CERES
1 CREAM (GRAM) TOPICAL
Status: DISCONTINUED | OUTPATIENT
Start: 2019-06-03 | End: 2019-06-03 | Stop reason: HOSPADM

## 2019-06-02 RX ADMIN — Medication 100 MG: at 08:21

## 2019-06-02 RX ADMIN — INSULIN LISPRO 4 UNITS: 100 INJECTION, SOLUTION INTRAVENOUS; SUBCUTANEOUS at 20:34

## 2019-06-02 RX ADMIN — LACTULOSE 15 ML: 20 SOLUTION ORAL at 15:53

## 2019-06-02 RX ADMIN — HYDROXYZINE HYDROCHLORIDE 50 MG: 50 TABLET, FILM COATED ORAL at 14:00

## 2019-06-02 RX ADMIN — CLONIDINE HYDROCHLORIDE 0.3 MG: 0.1 TABLET ORAL at 08:21

## 2019-06-02 RX ADMIN — THERA TABS 1 TABLET: TAB at 08:21

## 2019-06-02 RX ADMIN — FOLIC ACID 1 MG: 1 TABLET ORAL at 08:21

## 2019-06-02 RX ADMIN — TRAZODONE HYDROCHLORIDE 50 MG: 50 TABLET ORAL at 21:58

## 2019-06-02 RX ADMIN — INSULIN LISPRO 8 UNITS: 100 INJECTION, SOLUTION INTRAVENOUS; SUBCUTANEOUS at 11:51

## 2019-06-02 RX ADMIN — INSULIN LISPRO 3 UNITS: 100 INJECTION, SOLUTION INTRAVENOUS; SUBCUTANEOUS at 08:17

## 2019-06-02 RX ADMIN — CLONIDINE HYDROCHLORIDE 0.3 MG: 0.1 TABLET ORAL at 19:30

## 2019-06-02 RX ADMIN — INSULIN LISPRO 8 UNITS: 100 INJECTION, SOLUTION INTRAVENOUS; SUBCUTANEOUS at 16:49

## 2019-06-02 RX ADMIN — PANTOPRAZOLE SODIUM 40 MG: 40 TABLET, DELAYED RELEASE ORAL at 08:22

## 2019-06-02 RX ADMIN — INSULIN HUMAN 45 UNITS: 100 INJECTION, SUSPENSION SUBCUTANEOUS at 16:49

## 2019-06-02 RX ADMIN — INSULIN HUMAN 30 UNITS: 100 INJECTION, SUSPENSION SUBCUTANEOUS at 08:16

## 2019-06-02 RX ADMIN — INSULIN LISPRO 10 UNITS: 100 INJECTION, SOLUTION INTRAVENOUS; SUBCUTANEOUS at 11:51

## 2019-06-02 RX ADMIN — CITALOPRAM HYDROBROMIDE 20 MG: 20 TABLET ORAL at 08:21

## 2019-06-02 RX ADMIN — TRAMADOL HYDROCHLORIDE 50 MG: 50 TABLET, FILM COATED ORAL at 15:53

## 2019-06-02 RX ADMIN — INSULIN LISPRO 5 UNITS: 100 INJECTION, SOLUTION INTRAVENOUS; SUBCUTANEOUS at 16:50

## 2019-06-02 RX ADMIN — AMLODIPINE BESYLATE 10 MG: 5 TABLET ORAL at 08:21

## 2019-06-02 RX ADMIN — LACTULOSE 15 ML: 20 SOLUTION ORAL at 08:20

## 2019-06-02 RX ADMIN — LISINOPRIL 40 MG: 10 TABLET ORAL at 08:20

## 2019-06-02 RX ADMIN — TRAMADOL HYDROCHLORIDE 50 MG: 50 TABLET, FILM COATED ORAL at 08:21

## 2019-06-02 RX ADMIN — INSULIN LISPRO 5 UNITS: 100 INJECTION, SOLUTION INTRAVENOUS; SUBCUTANEOUS at 08:18

## 2019-06-02 NOTE — PROGRESS NOTES
Problem: Alcohol Withdrawal  Goal: *STG: Remains safe in hospital  Outcome: Progressing Towards Goal    Received pt in bed resting. Pt appears to be in no distress. Respirations even and unlabored. Continuing to monitor pt with q15 min safety rounds.

## 2019-06-02 NOTE — BH NOTES
7766: PRN Medication Documentation    Specific patient behavior that led to need for PRN medication: Pt requested medication.   PRN medication given: tramadol

## 2019-06-02 NOTE — PROGRESS NOTES
Problem: Diabetes Self-Management  Goal: *Disease process and treatment process  Description  Define diabetes and identify own type of diabetes; list 3 options for treating diabetes. Outcome: Progressing Towards Goal     Problem: Alcohol Withdrawal  Goal: *STG: Remains safe in hospital  Outcome: Progressing Towards Goal     Problem: Alcohol Withdrawal  Goal: *STG: Seeks staff when symptoms of withdrawal increase  Outcome: Progressing Towards Goal  Note:   0730 Pt sitting quietly at dining room table. Flat affect. irritable edge. No needs expressed. Will monitor with Q 15 safety checks. Med compliant.

## 2019-06-02 NOTE — BH NOTES
Chief Complaint:  I am ok     Interval History:  Mr. Jenetta Mortimer is more  compliant and less  Irritable. Past Medical History:  Past Medical History:   Diagnosis Date    Bipolar 1 disorder ([facility]     Diabetes ([facility]     Hep C w/o coma, chronic ([facility]     Hypertension     Liver cirrhosis, alcoholic ([facility]     Psychiatric disorder     depression, homicidal, suicidal           Labs:  Lab Results   Component Value Date/Time    WBC 4.0 (L) 05/28/2019 03:04 AM    Hemoglobin, POC 12.2 (L) 02/11/2011 09:41 AM    HGB 10.4 (L) 05/28/2019 03:04 AM    Hematocrit, POC 36 (L) 02/11/2011 09:41 AM    HCT 34.8 (L) 05/28/2019 03:04 AM    PLATELET 94 (L) 34/59/7953 03:04 AM    MCV 88.1 05/28/2019 03:04 AM      Lab Results   Component Value Date/Time    Sodium 138 05/28/2019 03:04 AM    Potassium 3.5 05/28/2019 03:04 AM    Chloride 107 05/28/2019 03:04 AM    CO2 23 05/28/2019 03:04 AM    Anion gap 8 05/28/2019 03:04 AM    Glucose 294 (H) 05/28/2019 03:04 AM    BUN 20 05/28/2019 03:04 AM    Creatinine 1.01 05/28/2019 03:04 AM    BUN/Creatinine ratio 20 05/28/2019 03:04 AM    GFR est AA >60 05/28/2019 03:04 AM    GFR est non-AA >60 05/28/2019 03:04 AM    Calcium 9.1 05/28/2019 03:04 AM    Bilirubin, total 0.8 05/28/2019 03:04 AM    AST (SGOT) 25 05/28/2019 03:04 AM    Alk.  phosphatase 106 05/28/2019 03:04 AM    Protein, total 7.2 05/28/2019 03:04 AM    Albumin 2.5 (L) 05/28/2019 03:04 AM    Globulin 4.7 (H) 05/28/2019 03:04 AM    A-G Ratio 0.5 (L) 05/28/2019 03:04 AM    ALT (SGPT) 27 05/28/2019 03:04 AM      Vitals:    06/01/19 1554 06/01/19 2006 06/02/19 0737 06/02/19 1142   BP: 151/90 107/67 (!) 160/95 127/85   Pulse: 85 68 76 79   Resp: 18 18 18    Temp: 98.9 °F (37.2 °C) 98.6 °F (37 °C) 98.1 °F (36.7 °C) 98.1 °F (36.7 °C)   SpO2: 99%  98%    Weight:       Height:               Mental Status Exam:  Eye contact: decreased  Psychomotor activity: WNL  Speech is spontaneous  Mood is \"frustrated\"  Affect: constricted  Perception: Denies any AH or VH. Suicidal ideation: Denies   Cognition is grossly intact      Physical Exam:  Body habitus: obese  Musculoskeletal system: normal gait  Tremor is not present  Cog wheeling is not present. Assessment and Plan:  Leela Cramer meets criteria for a diagnosis of Mood Disorder NOS    Get hospitalist consult. Continue the medication regimen as prescribed  Disposition planning to continue. I certify that this patients inpatient psychiatric hospital services furnished since the previous certification were, and continue to be, required for treatment that could reasonably be expected to improve the patient's condition, or for diagnostic study, and that the patient continues to need, on a daily basis, active treatment furnished directly by or requiring the supervision of inpatient psychiatric facility personnel. In addition, the hospital records show that services furnished were intensive treatment services, admission or related services, or equivalent services.

## 2019-06-02 NOTE — PROGRESS NOTES
06/01/19 1620   Pain Screen   Pain Scale 1 Numeric (0 - 10)   Pain Intensity 1 6   Pain Onset 1 immediate   Pain Location 1 Generalized   Pain Orientation 1 Lower   Pain Description 1 Aching   Pain Intervention(s) 1 Medication (see MAR)   Patient Stated Pain Goal 0     PRN tramadol given per pt request.       2134: PRN Medication Documentation  Specific patient behavior that led to need for PRN medication: Pt requested medication.   PRN medication given: trazodone

## 2019-06-02 NOTE — BH NOTES
PRN Medication Documentation    Specific patient behavior that led to need for PRN medication: Pt complaining of headache  Staff interventions attempted prior to PRN being given: quiet enviroment  PRN medication given: Tramadol PO  Patient response/effectiveness of PRN medication: will reassess

## 2019-06-02 NOTE — INTERDISCIPLINARY ROUNDS
Behavioral Health Interdisciplinary Rounds     Patient Name: Shaji Munson  Age: 44 y.o. Room/Bed:  728/  Primary Diagnosis: <principal problem not specified>   Admission Status: Voluntary     Readmission within 30 days: no  Power of  in place: no  Patient requires a blocked bed: no          Reason for blocked bed:    VTE Prophylaxis: No    Mobility needs/Fall risk: no  Flu Vaccine : no   Nutritional Plan: no  Consults:         Labs/Testing due today?: yes    Sleep hours: 5.5       Participation in Care/Groups:  yes  Medication Compliant?: Yes  PRNS (last 24 hours): Sleep Aid    Restraints (last 24 hours):  no     CIWA (range last 24 hours): CIWA-Ar Total: 0   COWS (range last 24 hours):      Alcohol screening (AUDIT) completed -   AUDIT Score: 8     If applicable, date SBIRT discussed in treatment team AND documented:   AUDIT Screen Score: AUDIT Score: 8      Document Brief Intervention (corresponds directly with the 5 A's, Ask, Advise, Assess, Assist, and Arrange): At- Risk Patients (Score 7-15 for women; 8-15 for men)  Discuss concern patient is drinking at unhealthy levels known to increase risk of alcohol-related health problems. Is Patient ready to commit to change? If No:   Encourage reflection   Discuss short term and long term health risks of consuming alcohol   Barriers to change   Reaffirm willingness to help / Educational materials provided  If Yes:   Set goal  Vestec provided    Harmful use or Dependence (Score 16 or greater)   Discuss short term and long term health risks of consuming alcohol   Recommendations   Negotiate drinking goal   Recommend addiction specialist/center   Arrange follow-up appointments.     Tobacco - patient is a smoker: Have You Used Tobacco in the Past 30 Days: Yes  Illegal Drugs use: Have You Used Any Illegal Substances Over the Past 12 Months: Yes    24 hour chart check complete: yes     Patient goal(s) for today:  Treatment team focus/goals:  Progress note    LOS:  4  Expected LOS:    Financial concerns/prescription coverage:  Date of last family contact:      Family requesting physician contact today:   Discharge plan:  Guns in the home:      Outpatient provider(s):    Participating treatment team members: Shaji Munson, * (assigned SW)

## 2019-06-02 NOTE — PROGRESS NOTES
Hospitalist Consult Note  Jorge Nayak NP  Answering service: [phone] from in house phone  Cell: [phone]   Date of Service:  2019  NAME:  Derek Arce  :  1970  MRN:  [id number]    Admission Summary:   Pt presented to the ED via EMS after being found slumped over in the stairwell at an extended stay hotel. Patient was a poor historian, unable to give any pertinent history. PMH significant for Alcohol Use Disorder, Liver Cirrhosis, Hepatitis C, Bipolar 1 Disorder, and Primary Hypertension     Hosptialists re-consulted for DM management, Hypertension management and pain control    Interval history / Subjective:      Pt lying in bed, reports he is happy with the diet change yesterday and is getting more food. We discussed his continued high blood glucose and increase of his NPH today. We also talked about the addition of lisinopril and his BP at lunch was good - happy with this. He indicates he does not feel with tramadol if working effectively and agreeable to shortening the time when it is available to q 6 hours. Assessment & Plan:     Uncontrolled Primary Hypertension:   - pt on norvasc and clonidinel  - lisinopril started today, last BP good    Uncontrolled Type 2 DM with complications including Hyperglycemia:  - HgbA1C of 11   - blood glucose range 243-386  - on NPH PTA at 60 units BID. Last lantus dose was on  and started NPH 30 units BID . Blood glucose still very high, increase meal time insulin as well as NPH starting this evening  - continue with accuchecks     Acute on probable chronic Hepatic encephalopathy due to Liver Cirrhosis with Hyperammonemia present on admission: resolved  - CT head without contrast negative for any acute findings.    - Continue with scheduled lactulose     Chronic Abdominal Pain:   - tramadol increased to q 6 hrs prn  - CT reviewed, has umbilical and ventral hernias     Alcoholic Liver Cirrhosis with mild coagulopathy. Hepatitis C:  - CT Abdomen and pelvis with some Splenomegaly and portal hypertension.      Thrombocytopenia probably due to the Alcoholic Liver Disease:   - No bleeding      Nutrition: Continue daily multivitamin/mineral, Thiamine and Folic Acid supplements.     Alcohol Use Disorder: Cessation counseling as per primary team     Suicidal ideation: as per psychiatry    Iron Deficiency:   - Fe 24 TIBC 406 %Sat 6  - start daily iron    Code status: Full  DVT prophylaxis: none indicated, pt up ad ami  Care Plan discussed with: patient, nurse  Disposition: as per primary team     Hospital Problems  Date Reviewed: 5/26/2019          Codes Class Noted POA    Bipolar 1 disorder, depressed ([facility] ICD-10-CM: F31.9  ICD-9-CM: 296.50  5/29/2019 Unknown            Review of Systems:   No HA. No chest pain or pressure. No respiratory complaints. + midline abdominal pain (reports is chronic) and knee pain (arthritis)    Vital Signs:    Last 24hrs VS reviewed since prior progress note. Most recent are:  Visit Vitals  /85 (BP 1 Location: Right arm)   Pulse 79   Temp 98.1 °F (36.7 °C)   Resp 18   Ht 5' 6\" (1.676 m)   Wt 90.7 kg (200 lb)   SpO2 98%   BMI 32.28 kg/m²     No intake or output data in the 24 hours ending 06/02/19 1515     Physical Examination:         Constitutional:  No acute distress, cooperative, pleasant    ENT:  Oral MM moist.    Resp:  No accessory muscle use and on RA    GI:  Soft, obese, Normoactive bowel sounds, + BM     Musculoskeletal:  Skin warm    Neurologic:  Moves all extremities. AAOx3     Data Review:   Review and/or order of clinical lab test  Review and/or order of tests in the radiology section of CPT  Review and/or order of tests in the medicine section of CPT    Labs:   No results for input(s): WBC, HGB, HCT, PLT, HGBEXT, HCTEXT, PLTEXT, HGBEXT, HCTEXT, PLTEXT in the last 72 hours.   No results for input(s): NA, K, CL, CO2, BUN, CREA, GLU, CA, MG, PHOS, URICA in the last 72 hours. No results for input(s): SGOT, GPT, ALT, AP, TBIL, TBILI, TP, ALB, GLOB, GGT, AML, LPSE in the last 72 hours. No lab exists for component: AMYP, HLPSE  No results for input(s): INR, PTP, APTT in the last 72 hours. No lab exists for component: INREXT, INREXT   Recent Labs     06/02/19  0605   TIBC 406   PSAT 6*   FERR 15*      Lab Results   Component Value Date/Time    Folate >24.0 (H) 08/09/2013 04:30 PM      No results for input(s): PH, PCO2, PO2 in the last 72 hours. No results for input(s): CPK, CKNDX, TROIQ in the last 72 hours.     No lab exists for component: CPKMB  No results found for: CHOL, CHOLX, CHLST, CHOLV, HDL, LDL, LDLC, DLDLP, TGLX, TRIGL, TRIGP, CHHD, CHHDX  Lab Results   Component Value Date/Time    Glucose (POC) 386 (H) 06/02/2019 11:19 AM    Glucose (POC) 243 (H) 06/02/2019 07:13 AM    Glucose (POC) 305 (H) 06/01/2019 08:44 PM    Glucose (POC) 328 (H) 06/01/2019 04:08 PM    Glucose (POC) 331 (H) 06/01/2019 11:15 AM     Lab Results   Component Value Date/Time    Color YELLOW/STRAW 05/25/2019 08:16 PM    Appearance CLEAR 05/25/2019 08:16 PM    Specific gravity 1.025 05/25/2019 08:16 PM    pH (UA) 5.0 05/25/2019 08:16 PM    Protein 30 (A) 05/25/2019 08:16 PM    Glucose >1,000 (A) 05/25/2019 08:16 PM    Ketone NEGATIVE  05/25/2019 08:16 PM    Bilirubin NEGATIVE  05/25/2019 08:16 PM    Urobilinogen 1.0 05/25/2019 08:16 PM    Nitrites NEGATIVE  05/25/2019 08:16 PM    Leukocyte Esterase NEGATIVE  05/25/2019 08:16 PM    Epithelial cells FEW 05/25/2019 08:16 PM    Bacteria NEGATIVE  05/25/2019 08:16 PM    WBC 0-4 05/25/2019 08:16 PM    RBC 10-20 05/25/2019 08:16 PM     Medications Reviewed:     Current Facility-Administered Medications   Medication Dose Route Frequency    insulin lispro (HUMALOG) injection 8 Units  8 Units SubCUTAneous TIDAC    insulin NPH (NOVOLIN N, HUMULIN N) injection 45 Units  45 Units SubCUTAneous ACB&D    traMADol (ULTRAM) tablet 50 mg  50 mg Oral Q8H PRN    lisinopril (PRINIVIL, ZESTRIL) tablet 40 mg  40 mg Oral DAILY    amLODIPine (NORVASC) tablet 10 mg  10 mg Oral DAILY    citalopram (CELEXA) tablet 20 mg  20 mg Oral DAILY    ziprasidone (GEODON) 20 mg in sterile water (preservative free) 1 mL injection  20 mg IntraMUSCular BID PRN    OLANZapine (ZyPREXA) tablet 5 mg  5 mg Oral Q6H PRN    benztropine (COGENTIN) tablet 2 mg  2 mg Oral BID PRN    benztropine (COGENTIN) injection 2 mg  2 mg IntraMUSCular BID PRN    magnesium hydroxide (MILK OF MAGNESIA) 400 mg/5 mL oral suspension 30 mL  30 mL Oral DAILY PRN    nicotine (NICODERM CQ) 21 mg/24 hr patch 1 Patch  1 Patch TransDERmal DAILY PRN    traZODone (DESYREL) tablet 50 mg  50 mg Oral QHS PRN    hydrOXYzine HCl (ATARAX) tablet 50 mg  50 mg Oral Q4H PRN    dextrose (D50W) injection syrg 12.5-25 g  25-50 mL IntraVENous PRN    glucagon (GLUCAGEN) injection 1 mg  1 mg IntraMUSCular PRN    glucose chewable tablet 16 g  4 Tab Oral PRN    insulin lispro (HUMALOG) injection   SubCUTAneous AC&HS    cloNIDine HCl (CATAPRES) tablet 0.3 mg  0.3 mg Oral BID    folic acid (FOLVITE) tablet 1 mg  1 mg Oral DAILY    lactulose (CHRONULAC) 10 gram/15 mL solution 15 mL  10 g Oral BID    pantoprazole (PROTONIX) tablet 40 mg  40 mg Oral DAILY    therapeutic multivitamin (THERAGRAN) tablet 1 Tab  1 Tab Oral DAILY    thiamine HCL (B-1) tablet 100 mg  100 mg Oral DAILY   ______________________________________________________________________  EXPECTED LENGTH OF STAY: - - -  ACTUAL LENGTH OF STAY:          400 Dimitri , NP

## 2019-06-03 VITALS
BODY MASS INDEX: 32.14 KG/M2 | SYSTOLIC BLOOD PRESSURE: 146 MMHG | DIASTOLIC BLOOD PRESSURE: 94 MMHG | HEART RATE: 72 BPM | TEMPERATURE: 98.6 F | WEIGHT: 200 LBS | RESPIRATION RATE: 14 BRPM | OXYGEN SATURATION: 97 % | HEIGHT: 66 IN

## 2019-06-03 LAB
GLUCOSE BLD STRIP.AUTO-MCNC: 230 MG/DL (ref 65–100)
GLUCOSE BLD STRIP.AUTO-MCNC: 318 MG/DL (ref 65–100)
GLUCOSE BLD STRIP.AUTO-MCNC: 319 MG/DL (ref 65–100)
SERVICE CMNT-IMP: ABNORMAL

## 2019-06-03 PROCEDURE — 74011636637 HC RX REV CODE- 636/637: Performed by: NURSE PRACTITIONER

## 2019-06-03 PROCEDURE — 74011636637 HC RX REV CODE- 636/637: Performed by: INTERNAL MEDICINE

## 2019-06-03 PROCEDURE — 74011636637 HC RX REV CODE- 636/637: Performed by: HOSPITALIST

## 2019-06-03 PROCEDURE — 74011250637 HC RX REV CODE- 250/637: Performed by: HOSPITALIST

## 2019-06-03 PROCEDURE — 74011250637 HC RX REV CODE- 250/637: Performed by: PSYCHIATRY & NEUROLOGY

## 2019-06-03 PROCEDURE — 82962 GLUCOSE BLOOD TEST: CPT

## 2019-06-03 PROCEDURE — 74011250637 HC RX REV CODE- 250/637: Performed by: NURSE PRACTITIONER

## 2019-06-03 RX ORDER — LISINOPRIL 40 MG/1
40 TABLET ORAL DAILY
Qty: 30 TAB | Refills: 0 | Status: SHIPPED | OUTPATIENT
Start: 2019-06-03 | End: 2019-08-02 | Stop reason: SDUPTHER

## 2019-06-03 RX ORDER — CITALOPRAM 20 MG/1
20 TABLET, FILM COATED ORAL DAILY
Qty: 30 TAB | Refills: 0 | Status: SHIPPED | OUTPATIENT
Start: 2019-06-04 | End: 2019-01-01 | Stop reason: ALTCHOICE

## 2019-06-03 RX ORDER — LANOLIN ALCOHOL/MO/W.PET/CERES
325 CREAM (GRAM) TOPICAL
Qty: 30 TAB | Refills: 0 | Status: SHIPPED | OUTPATIENT
Start: 2019-06-04 | End: 2019-08-02

## 2019-06-03 RX ORDER — PANTOPRAZOLE SODIUM 40 MG/1
40 TABLET, DELAYED RELEASE ORAL DAILY
Qty: 30 TAB | Refills: 0 | Status: SHIPPED | OUTPATIENT
Start: 2019-06-04 | End: 2019-08-02 | Stop reason: SDUPTHER

## 2019-06-03 RX ORDER — AMLODIPINE BESYLATE 10 MG/1
10 TABLET ORAL DAILY
Qty: 30 TAB | Refills: 0 | Status: SHIPPED | OUTPATIENT
Start: 2019-06-04 | End: 2019-08-02 | Stop reason: SDUPTHER

## 2019-06-03 RX ORDER — CLONIDINE HYDROCHLORIDE 0.3 MG/1
0.3 TABLET ORAL 2 TIMES DAILY
Qty: 60 TAB | Refills: 0 | Status: SHIPPED | OUTPATIENT
Start: 2019-06-03 | End: 2019-08-02 | Stop reason: SDUPTHER

## 2019-06-03 RX ORDER — TAMSULOSIN HYDROCHLORIDE 0.4 MG/1
0.4 CAPSULE ORAL DAILY
Qty: 30 CAP | Refills: 0 | Status: SHIPPED | OUTPATIENT
Start: 2019-06-03 | End: 2019-08-02 | Stop reason: SDUPTHER

## 2019-06-03 RX ORDER — HYDROXYZINE 50 MG/1
50 TABLET, FILM COATED ORAL
Status: DISCONTINUED | OUTPATIENT
Start: 2019-06-03 | End: 2019-06-03 | Stop reason: HOSPADM

## 2019-06-03 RX ADMIN — INSULIN HUMAN 55 UNITS: 100 INJECTION, SUSPENSION SUBCUTANEOUS at 08:11

## 2019-06-03 RX ADMIN — INSULIN LISPRO 8 UNITS: 100 INJECTION, SOLUTION INTRAVENOUS; SUBCUTANEOUS at 11:29

## 2019-06-03 RX ADMIN — TRAMADOL HYDROCHLORIDE 50 MG: 50 TABLET, FILM COATED ORAL at 14:24

## 2019-06-03 RX ADMIN — INSULIN LISPRO 3 UNITS: 100 INJECTION, SOLUTION INTRAVENOUS; SUBCUTANEOUS at 08:10

## 2019-06-03 RX ADMIN — AMLODIPINE BESYLATE 10 MG: 5 TABLET ORAL at 08:07

## 2019-06-03 RX ADMIN — FOLIC ACID 1 MG: 1 TABLET ORAL at 08:58

## 2019-06-03 RX ADMIN — INSULIN HUMAN 55 UNITS: 100 INJECTION, SUSPENSION SUBCUTANEOUS at 16:44

## 2019-06-03 RX ADMIN — INSULIN LISPRO 7 UNITS: 100 INJECTION, SOLUTION INTRAVENOUS; SUBCUTANEOUS at 16:45

## 2019-06-03 RX ADMIN — Medication 100 MG: at 08:07

## 2019-06-03 RX ADMIN — PANTOPRAZOLE SODIUM 40 MG: 40 TABLET, DELAYED RELEASE ORAL at 08:05

## 2019-06-03 RX ADMIN — LISINOPRIL 40 MG: 10 TABLET ORAL at 08:06

## 2019-06-03 RX ADMIN — INSULIN LISPRO 8 UNITS: 100 INJECTION, SOLUTION INTRAVENOUS; SUBCUTANEOUS at 16:45

## 2019-06-03 RX ADMIN — LACTULOSE 15 ML: 20 SOLUTION ORAL at 08:06

## 2019-06-03 RX ADMIN — CITALOPRAM HYDROBROMIDE 20 MG: 20 TABLET ORAL at 08:07

## 2019-06-03 RX ADMIN — THERA TABS 1 TABLET: TAB at 08:07

## 2019-06-03 RX ADMIN — INSULIN LISPRO 8 UNITS: 100 INJECTION, SOLUTION INTRAVENOUS; SUBCUTANEOUS at 08:11

## 2019-06-03 RX ADMIN — CLONIDINE HYDROCHLORIDE 0.3 MG: 0.1 TABLET ORAL at 08:06

## 2019-06-03 RX ADMIN — INSULIN LISPRO 7 UNITS: 100 INJECTION, SOLUTION INTRAVENOUS; SUBCUTANEOUS at 11:29

## 2019-06-03 RX ADMIN — FERROUS SULFATE TAB 325 MG (65 MG ELEMENTAL FE) 325 MG: 325 (65 FE) TAB at 08:58

## 2019-06-03 NOTE — PROGRESS NOTES
In bed asleep with respirations noted as even and unlabored as chest was rising and falling. Did Ciwa/etoh scoring prior to going to bed and scored 0. Will continue to monitor for safety and 15 minute checks throughout shift.

## 2019-06-03 NOTE — DISCHARGE SUMMARY
Some parts of the discharge summary are from the initial Psychiatric interview that was done on admission by the admitting psychiatrist.      Date of Admission: 5/29/2019    Date of Discharge:6/3/2019     TYPE OF DISCHARGE:   REGULAR -  YES  AMA  RELEASED BY THE [facility] COURT     CHIEF COMPLAINT:  \"I am still feeling pretty bad. \"     HISTORY OF PRESENT ILLNESS:  The patient is a 61-year-old [facility] American man who is transferred to us from the medical floor at [facility].  He had presented there 3 days ago with a history of drinking heavily and had requested to be detoxed. His detox was completed; although, after reviewing the records, it does not appear that he actually had any withdrawal symptoms. He had been found slumped over in a hotel in the hallway in an intoxicated state. His blood sugar at that time had been 393 and blood alcohol level was 194. States that he had had about 2 pints of alcohol to drink and he drinks that much every other day. Also, was using heroin and says that he uses the point of heroin most days. Currently transferred to us because he reported thoughts of homicide towards people. When I asked him about this, he said that he was angry at the people who had given him drugs because his urine was positive for cocaine and he says he has never used cocaine and thinks that his heroin had been contaminated. He says he has also been having passive thoughts of suicide but was unable to give me any further details. States he has been depressed because he is homeless and his living situation has been very difficult recently.   Denies any psychotic symptoms at the present time.     PAST MEDICAL HISTORY:  Reviewed as per the history and physical exam.          Past Medical History:   Diagnosis Date    B[facility] 1 disorder ([facility]      Diabetes ([facility]      Hep C w/o coma, chronic (HCC)      Hypertension      Liver cirrhosis, alcoholic (HCC)      Psychiatric disorder       depression, homicidal, suicidal              Prior to Admission medications    Medication Sig Start Date End Date Taking? Authorizing Provider   cloNIDine HCl (CATAPRES) 0.3 mg tablet Take 0.3 mg by mouth two (2) times a day. Indications: high blood pressure       Provider, Historical   oxyCODONE-acetaminophen (PERCOCET 7.5) 7.5-325 mg per tablet Take 1 Tab by mouth every six (6) hours as needed. 8/22/13     Jose M Skaggs MD   traMADol Alver Galas) 50 mg tablet Take 1 Tab by mouth every six (6) hours as needed. 8/22/13     Jose M Skaggs MD   folic acid (FOLVITE) 1 mg tablet Take 1 Tab by mouth daily. 8/5/13     Lissa Barcenas MD   metoclopramide HCl (REGLAN) 5 mg/mL injection 2 mL by IntraVENous route every six (6) hours as needed.  8/5/13     Tamie Finn MD    [unfilled]          Vitals:     05/30/19 1623 05/30/19 1907 05/31/19 0819 05/31/19 0946   BP: (!) 155/104 (!) 148/96 (!) 162/117 131/89   Pulse: 80 81 81 90   Resp:   18 16 16   Temp: 98.1 °F (36.7 °C) 98.3 °F (36.8 °C) 98 °F (36.7 °C) 98 °F (36.7 °C)   SpO2:   98% 98% 98%   Weight:           Height:                    Lab Results   Component Value Date/Time     WBC 4.0 (L) 05/28/2019 03:04 AM     Hemoglobin, POC 12.2 (L) 02/11/2011 09:41 AM     HGB 10.4 (L) 05/28/2019 03:04 AM     Hematocrit, POC 36 (L) 02/11/2011 09:41 AM     HCT 34.8 (L) 05/28/2019 03:04 AM     PLATELET 94 (L) 61/94/0596 03:04 AM     MCV 88.1 05/28/2019 03:04 AM            Lab Results   Component Value Date/Time     Sodium 138 05/28/2019 03:04 AM     Potassium 3.5 05/28/2019 03:04 AM     Chloride 107 05/28/2019 03:04 AM     CO2 23 05/28/2019 03:04 AM     Anion gap 8 05/28/2019 03:04 AM     Glucose 294 (H) 05/28/2019 03:04 AM     BUN 20 05/28/2019 03:04 AM     Creatinine 1.01 05/28/2019 03:04 AM     BUN/Creatinine ratio 20 05/28/2019 03:04 AM     GFR est AA >60 05/28/2019 03:04 AM     GFR est non-AA >60 05/28/2019 03:04 AM     Calcium 9.1 05/28/2019 03:04 AM     Bilirubin, total 0.8 05/28/2019 03:04 AM     AST (SGOT) 25 05/28/2019 03:04 AM     Alk. phosphatase 106 05/28/2019 03:04 AM     Protein, total 7.2 05/28/2019 03:04 AM     Albumin 2.5 (L) 05/28/2019 03:04 AM     Globulin 4.7 (H) 05/28/2019 03:04 AM     A-G Ratio 0.5 (L) 05/28/2019 03:04 AM     ALT (SGPT) 27 05/28/2019 03:04 AM            PAST PSYCHIATRIC HISTORY:  The patient has a longstanding history of alcohol, cocaine, and opiate abuse, but says he has not been using heroin recently. According to notes from the ER, he had been hospitalized at [facility] for alcohol dependence and just released a week ago. He is reluctant to give details about his substance abuse history.     PSYCHOSOCIAL HISTORY:  The patient is currently homeless. States that he had been living on the streets of [facility] and is originally from [place]. He has been  twice and is  at the present time. He has 7 children and keeps in occasional touch with some of them. Currently, he is unemployed and does not have an income. The patient was incarcerated for 6-1/2 years for possession and distribution of heroin and was just released from senior care in 01/2019. Currently, he has to report to his  regularly.     MENTAL STATUS EXAM:  The patient is a middle-aged [facility] American man who is dressed in hospital apparel. He makes minimal eye contact and sits with his eyes towards the floor. Speech is spontaneous and coherent but slightly reduced in output. Makes little eye contact. His affect is depressed and mood is reported as being down. Denies any active suicidal ideation or plan. Denies any perceptual abnormalities. Denies any delusions. His thought process is logical and goal-directed. Cognitively, he is awake and alert, oriented to time, place, and person. Intelligence is average. Memory is intact and fund of knowledge is adequate. Insight is poor.   Judgment is poor.     ASSESSMENT AND PLAN/DIAGNOSIS:  Mood disorder unspecified, alcohol and opiate dependence.     Course in the Hospital:   Patient was admitted to the inpatient psychiatry unit for acute psychiatric stabilization in regards to symptomatology as described in the HPI above and placed on Q15 minute checks and withdrawal precautions. While on the unit Roxanne Gaviria was involved in individual, group, occupational and milieu therapy. He was started back on his usual medication regimen as well as PRN medications including Phenobarbitone for detox, Celexa for anxiety and insulin for his diabetes. He improved gradually and was able to integrate into the milieu with help from the nursing staff. Patients symptoms improved gradually including depressed mood, withdrawal symptoms, suicidal ideation and hopelesness. He was quite on the unit, appropriate in his interactions, and cooperative with medications and the unit routine. Please see individual progress notes for more specific details regarding patient's hospitalization course. Patient was discharged as per the plan. He had been doing well on the unit as per the report of the nursing staff and my observations. No PRN medication for agitation, seclusion or restraints were required during the last 48 hours of her stay. Roxanne Gaviria had improved progressively to the point of being stable for discharge and outpatient FU. At this time he did not offer any complaints. Patient denied any SI or HI. Denied any AH or VH. He denied any delusions. Was not considered a danger to self or to others and is safe for discharge. Will FU with his appointments and remains motivated to be in treatment. The patient verbalized understanding of his discharge instructions. DISCHARGE DIAGNOSIS:  MOOD DISORDER unspecified, Opiate and Alcohol dependence    MENTAL STATUS EXAM ON DISCHARGE:    General appearance:   Roxanne Gaviria is a 44 y.o.  BLACK OR  male who is well groomed, psychomotor activity is WNL  Eye contact: makes good eye contact  Speech: Spontaneous and coherent  Affect : Euthymic  Mood: \"OK\"  Thought Process: Logical, goal directed  Perception: Denies any AH or VH. Thought Content: Denies any SI or Plan  Insight: Partial  Judgement: Fair  Cognition: Intact grossly. Current Discharge Medication List      START taking these medications    Details   citalopram (CELEXA) 20 mg tablet Take 1 Tab by mouth daily. Indications: depression  Qty: 30 Tab, Refills: 0      amLODIPine (NORVASC) 10 mg tablet Take 1 Tab by mouth daily. Indications: high blood pressure  Qty: 30 Tab, Refills: 0      ferrous sulfate 325 mg (65 mg iron) tablet Take 1 Tab by mouth daily (with breakfast). Indications: anemia from inadequate iron  Qty: 30 Tab, Refills: 0      lactulose (CHRONULAC) 10 gram/15 mL solution Take 15 mL by mouth two (2) times a day. Indications: hyperammonemia  Qty: 1 Bottle, Refills: 0      pantoprazole (PROTONIX) 40 mg tablet Take 1 Tab by mouth daily. Indications: gastroesophageal reflux disease  Qty: 30 Tab, Refills: 0         CONTINUE these medications which have CHANGED    Details   tamsulosin (FLOMAX) 0.4 mg capsule Take 1 Cap by mouth daily. Indications: enlarged prostate with urination problem  Qty: 30 Cap, Refills: 0      cloNIDine HCl (CATAPRES) 0.3 mg tablet Take 1 Tab by mouth two (2) times a day. Indications: high blood pressure  Qty: 60 Tab, Refills: 0      lisinopril (PRINIVIL, ZESTRIL) 40 mg tablet Take 1 Tab by mouth daily. Indications: high blood pressure  Qty: 30 Tab, Refills: 0         STOP taking these medications       furosemide (LASIX) 20 mg tablet Comments:   Reason for Stopping:         DULoxetine (CYMBALTA) 60 mg capsule Comments:   Reason for Stopping:                 Follow-up Information     Follow up With Specialties Details Why Contact Info    [address]    [address] [place] [address]  [phone]    [facility]    [address] [facility] [address]    None None (395) Patient stated that they have no PCP          WOUND CARE: none needed. PROGNOSIS:   Good / Fair based on nature of patient's pathology/ies and treatment compliance issues. Prognosis is greatly dependent upon patient's ability to  follow up on psychiatric/psychotherapy appointments as well as to comply with psychiatric medications as prescribed.

## 2019-06-03 NOTE — PROGRESS NOTES
Pharmacist Discharge Medication Reconciliation    Discharging Provider: Dr. Дмитрий Kelley PMH:   Past Medical History:   Diagnosis Date    Bipolar 1 disorder ([facility]     Diabetes ([facility]     Hep C w/o coma, chronic ([facility]     Hypertension     Liver cirrhosis, alcoholic ([facility]     Psychiatric disorder     depression, homicidal, suicidal     Chief Complaint for this Admission: No chief complaint on file. Allergies: Patient has no known allergies. Discharge Medications:   Current Discharge Medication List        START taking these medications    Details   citalopram (CELEXA) 20 mg tablet Take 1 Tab by mouth daily. Indications: depression  Qty: 30 Tab, Refills: 0      amLODIPine (NORVASC) 10 mg tablet Take 1 Tab by mouth daily. Indications: high blood pressure  Qty: 30 Tab, Refills: 0      ferrous sulfate 325 mg (65 mg iron) tablet Take 1 Tab by mouth daily (with breakfast). Indications: anemia from inadequate iron  Qty: 30 Tab, Refills: 0      lactulose (CHRONULAC) 10 gram/15 mL solution Take 15 mL by mouth two (2) times a day. Indications: hyperammonemia  Qty: 1 Bottle, Refills: 0      pantoprazole (PROTONIX) 40 mg tablet Take 1 Tab by mouth daily. Indications: gastroesophageal reflux disease  Qty: 30 Tab, Refills: 0           CONTINUE these medications which have CHANGED    Details   tamsulosin (FLOMAX) 0.4 mg capsule Take 1 Cap by mouth daily. Indications: enlarged prostate with urination problem  Qty: 30 Cap, Refills: 0      cloNIDine HCl (CATAPRES) 0.3 mg tablet Take 1 Tab by mouth two (2) times a day. Indications: high blood pressure  Qty: 60 Tab, Refills: 0      lisinopril (PRINIVIL, ZESTRIL) 40 mg tablet Take 1 Tab by mouth daily. Indications: high blood pressure  Qty: 30 Tab, Refills: 0           CONTINUE these medications which have NOT CHANGED    Details   insulin NPH (HUMULIN N NPH U-100 INSULIN) 100 unit/mL injection 60 Units by SubCUTAneous route two (2) times a day.  At 9:00 AM and 9:00 PM Indications: type 2 diabetes mellitus           STOP taking these medications       furosemide (LASIX) 20 mg tablet Comments:   Reason for Stopping:         DULoxetine (CYMBALTA) 60 mg capsule Comments:   Reason for Stopping:             The patient's chart, MAR and AVS were reviewed by BRANDI AndradeD.

## 2019-06-03 NOTE — BH NOTES
GROUP THERAPY PROGRESS NOTE    Yessi Brink is participating in [place]. Group time: 30 minutes    Personal goal for participation: N/A    Goal orientation: community    Group therapy participation: passive    Therapeutic interventions reviewed and discussed: Community Group and Harshil    Impression of participation: The patient came for a short amount of time then left after the rules.

## 2019-06-03 NOTE — PROGRESS NOTES
Problem: Diabetes Self-Management  Goal: *Monitoring blood glucose, interpreting and using results  Description  Identify recommended blood glucose targets  and personal targets. Outcome: Progressing Towards Goal  Note:   Has allowed glucose monitoring and insulin sliding scale usage. Problem: Alcohol Withdrawal  Goal: *STG: Participates in treatment plan  Outcome: Resolved/Not Met     Problem: Alcohol Withdrawal  Goal: *STG: Remains safe in hospital  Outcome: Resolved/Not Met     Problem: Alcohol Withdrawal  Goal: *STG: Seeks staff when symptoms of withdrawal increase  Outcome: Resolved/Not Met     Problem: Alcohol Withdrawal  Goal: *STG: Complies with medication therapy  Outcome: Resolved/Not Met     Problem: Alcohol Withdrawal  Goal: *STG: Will identify negative impact of chemical dependency including the use of tobacco, alcohol, and other substances  Outcome: Resolved/Not Met     Problem: Alcohol Withdrawal  Goal: *STG: Verbalizes abstinence as an achievable goal  Outcome: Resolved/Not Met     Problem: Alcohol Withdrawal  Goal: Interventions  Outcome: Resolved/Not Met     Problem: Suicide/Homicide (Adult/Pediatric)  Goal: *STG: Remains safe in hospital  Outcome: Progressing Towards Goal  Note:   Pt denies any suicidal or homicidal thoughts. Contracts for safety. Remains on q 15 min safety checks. No agitation or aggression displayed.

## 2019-06-03 NOTE — BH NOTES
Called Diabetes Management Team. Left message. Will await call back. Diabetes management team member called back and stated she would be up to the unit in 15-20 minutes.

## 2019-06-03 NOTE — DIABETES MGMT
[facility]    D[facility] Consult Note    Recommendations/ Comments: If appropriate, please consider:   - discharging on home dose of NPH since patient receiving Humalog here. Do not feel patient would be able to do meal time insulin at home. Current hospital DM medication: NPH 55 units BID, Lispro 8 units tid ac and for correction, normal sensitivitiy    Consult received for:  [x]             Assessment of home management                [x]      Medication Recommendations                [x]             Meter/monitoring    Chart reviewed and initial evaluation complete on Yessi Brink. Patient is a 44 y.o. male with known diabetes on insulin injections: NPH: 60 units BID at home. He has not had a glucose meter for some time, so one was provided and patient was instructed on use. He reports trying to avoid sweet drinks, but his eating is not consistent. He is reported to be homeless. Assessed and instructed patient on the following:   ·  interpretation of lab results, blood sugar goals, complications of diabetes mellitus, hypoglycemia prevention and treatment, insulin adjustments, SMBG skills and nutrition    Encouraged the following:   · dietary modifications: eat 3 meals a day and avoid sweet drinks, regular blood sugar monitorin times daily    Provided patient with the following: [x]             Diabetes Self Care Guide               [x]             Outpatient DTC contact number               [x]             Glucometer - One Touch Verio Flex               Patient was able to give return demonstration of    [x]       Glucometer    Discussed with patient and/or family need for follow up appointment for diabetes management after discharge.       A1c:   Lab Results   Component Value Date/Time    Hemoglobin A1c 11.0 (H) 2019 07:20 PM       Recent Glucose Results:   Lab Results   Component Value Date/Time    GLUCPOC 318 (H) 2019 11:09 AM    GLUCPOC 230 (H) 2019 07:44 AM GLUCPOC 317 (H) 06/02/2019 08:22 PM        Lab Results   Component Value Date/Time    Creatinine 1.01 05/28/2019 03:04 AM     Estimated Creatinine Clearance: 91.3 mL/min (based on SCr of 1.01 mg/dL). Active Orders   Diet    DIET DIABETIC WITH OPTIONS Consistent Carb 2200kcal; Regular        PO intake: No data found. Will continue to follow as needed. Thank you.   Reuben Harmon MS, RN, CDE    Time spent: 20 minutes

## 2019-06-03 NOTE — BH NOTES
PHARMACY CALLED TO NOTIFY THEY WERE CHANGING INSULIN ORDERS FOR THIS MORNING.    AM BLOOD SUGAR 230mg/dl

## 2019-06-03 NOTE — BH NOTES
Called in prescription for Insulin NPH. See AVS for orders. Verbal Order from MD Adeline Morales to call in patient insulin order. Diabetes management Nurse came to see pt,agreed with Insulin MD ordered. (See Note)  Pt received glucometer and supplies from [employer]

## 2019-06-03 NOTE — BH NOTES
Pt discharge instructions have been reviewed with pt and belongings have been returned. Pt medicaid cab is here in discharge area. Staff escorts pt off unit to meet cab in discharge area.

## 2019-06-03 NOTE — BH NOTES
PRN Medication Documentation    Specific patient behavior that led to need for PRN medication: Pt requesting sleeping medication  Staff interventions attempted prior to PRN being given: Pt resting quietly  PRN medication given: Trazodone PO  Patient response/effectiveness of PRN medication: Will continue tomonitor

## 2019-06-03 NOTE — BH NOTES
Behavioral Health Transition Record to Provider    Patient Name: Solomon Crisostomo  YOB: 1970  Medical Record Number: [id number]  Date of Admission: 5/29/2019  Date of Discharge: 6/3/2019     Attending Provider: Yovanny Samuel MD  Discharging Provider: Dr. Jovanna Carrasquillo   To contact this individual call [phone] and ask the  to page. If unavailable, ask to be transferred to [facility] Provider on call. [facility] Provider will be available on call 24/7 and during holidays. Primary Care Provider: None    No Known Allergies    Reason for Admission:   CHIEF COMPLAINT:  \"I am still feeling pretty bad. \"     HISTORY OF PRESENT ILLNESS:  The patient is a 53-year-old [facility] [place] man who is transferred to us from the medical floor at [facility].  He had presented there 3 days ago with a history of drinking heavily and had requested to be detoxed. His detox was completed; although, after reviewing the records, it does not appear that he actually had any withdrawal symptoms. He had been found slumped over in a hotel in the hallway in an intoxicated state. His blood sugar at that time had been 393 and blood alcohol level was 194. States that he had had about 2 pints of alcohol to drink and he drinks that much every other day. Also, was using heroin and says that he uses the point of heroin most days. Currently transferred to us because he reported thoughts of homicide towards people. When I asked him about this, he said that he was angry at the people who had given him drugs because his urine was positive for cocaine and he says he has never used cocaine and thinks that his heroin had been contaminated. He says he has also been having passive thoughts of suicide but was unable to give me any further details. States he has been depressed because he is homeless and his living situation has been very difficult recently.          Admission Diagnosis: Bipolar 1 disorder, [facility] [F31.9]    * No surgery found *    Results for orders placed or performed during the hospital encounter of 05/29/19   FERRITIN   Result Value Ref Range    Ferritin 15 (L) 26 - 388 NG/ML   IRON PROFILE   Result Value Ref Range    Iron 24 (L) 35 - 150 ug/dL    TIBC 406 250 - 450 ug/dL    Iron % saturation 6 (L) 20 - 50 %   GLUCOSE, POC   Result Value Ref Range    Glucose (POC) 205 (H) 65 - 100 mg/dL    Performed by Agustín Acosta    GLUCOSE, POC   Result Value Ref Range    Glucose (POC) 240 (H) 65 - 100 mg/dL    Performed by Opal Coker    GLUCOSE, POC   Result Value Ref Range    Glucose (POC) 291 (H) 65 - 100 mg/dL    Performed by Romy Felipe    GLUCOSE, POC   Result Value Ref Range    Glucose (POC) 179 (H) 65 - 100 mg/dL    Performed by [facility] Malrene    GLUCOSE, POC   Result Value Ref Range    Glucose (POC) 245 (H) 65 - 100 mg/dL    Performed by Surjit Sweet    GLUCOSE, POC   Result Value Ref Range    Glucose (POC) 246 (H) 65 - 100 mg/dL    Performed by Eric Nixon    GLUCOSE, POC   Result Value Ref Range    Glucose (POC) 348 (H) 65 - 100 mg/dL    Performed by [email or web address]    GLUCOSE, POC   Result Value Ref Range    Glucose (POC) 202 (H) 65 - 100 mg/dL    Performed by Marci Rivera    GLUCOSE, POC   Result Value Ref Range    Glucose (POC) 395 (H) 65 - 100 mg/dL    Performed by Marci Rivera    GLUCOSE, POC   Result Value Ref Range    Glucose (POC) 342 (H) 65 - 100 mg/dL    Performed by Marci Rivera    GLUCOSE, POC   Result Value Ref Range    Glucose (POC) 243 (H) 65 - 100 mg/dL    Performed by [facility] Marlene    GLUCOSE, POC   Result Value Ref Range    Glucose (POC) 331 (H) 65 - 100 mg/dL    Performed by [facility] Luis Daniel    GLUCOSE, POC   Result Value Ref Range    Glucose (POC) 328 (H) 65 - 100 mg/dL    Performed by Surjit Sweet    GLUCOSE, POC   Result Value Ref Range    Glucose (POC) 305 (H) 65 - 100 mg/dL    Performed by Surjit Sweet    GLUCOSE, POC   Result Value Ref Range    Glucose (POC) 243 (H) 65 - 100 mg/dL    Performed by Amber Nash    GLUCOSE, POC   Result Value Ref Range    Glucose (POC) 386 (H) 65 - 100 mg/dL    Performed by Cecilia Yanez    GLUCOSE, POC   Result Value Ref Range    Glucose (POC) 266 (H) 65 - 100 mg/dL    Performed by MALGORZATA RANDHAWAAH    GLUCOSE, POC   Result Value Ref Range    Glucose (POC) 317 (H) 65 - 100 mg/dL    Performed by MALGORZATA RANDHAWAAH    GLUCOSE, POC   Result Value Ref Range    Glucose (POC) 230 (H) 65 - 100 mg/dL    Performed by [employer] Izac    GLUCOSE, POC   Result Value Ref Range    Glucose (POC) 318 (H) 65 - 100 mg/dL    Performed by [employer] Izac    GLUCOSE, POC   Result Value Ref Range    Glucose (POC) 319 (H) 65 - 100 mg/dL    Performed by Sue Cantu        Immunizations administered during this encounter: There is no immunization history on file for this patient. Screening for Metabolic Disorders for Patients on Antipsychotic Medications  (Data obtained from the EMR)    Estimated Body Mass Index  Estimated body mass index is 32.28 kg/m² as calculated from the following:    Height as of this encounter: 5' 6\" (1.676 m). Weight as of this encounter: 90.7 kg (200 lb). Vital Signs/Blood Pressure  Visit Vitals  BP (!) 146/94   Pulse 72   Temp 98.6 °F (37 °C)   Resp 14   Ht 5' 6\" (1.676 m)   Wt 90.7 kg (200 lb)   SpO2 97%   BMI 32.28 kg/m²       Blood Glucose/Hemoglobin A1c  Lab Results   Component Value Date/Time    Glucose 294 (H) 05/28/2019 03:04 AM    Glucose (POC) 319 (H) 06/03/2019 04:35 PM       Lab Results   Component Value Date/Time    Hemoglobin A1c 11.0 (H) 05/25/2019 07:20 PM        Lipid Panel  No results found for: CHOL, CHOLX, CHLST, CHOLV, [id number], HDL, LDL, LDLC, DLDLP, TGLX, TRIGL, TRIGP, CHHD, [facility]     Discharge Diagnosis: Bipolar Discharge     Discharge Plan: He will be discharge to the [facility] in [place]. The patient Jose Lopez exhibits the ability to control behavior in a less restrictive environment. Patient's level of functioning is improving. No assaultive/destructive behavior has been observed for the past 24 hours. No suicidal/homicidal threat or behavior has been observed for the past 24 hours. There is no evidence of serious medication side effects. Patient has not been in physical or protective restraints for at least the past 24 hours. If weapons involved, how are they secured? No weapons involved     Is patient aware of and in agreement with discharge plan? Patient is aware of discharge     Arrangements for medication:  Prescription given to patient     Copy of discharge instructions to  provider?: yes, fax to [facility]     Arrangements for transportation home:  Medicaid taxi     Keep all follow up appointments as scheduled, continue to take prescribed medications per physician instructions. Mental health crisis number:  [phone] or your local mental health crisis line number at [phone]. Discharge Medication List and Instructions:   Current Discharge Medication List      START taking these medications    Details   citalopram (CELEXA) 20 mg tablet Take 1 Tab by mouth daily. Indications: depression  Qty: 30 Tab, Refills: 0      amLODIPine (NORVASC) 10 mg tablet Take 1 Tab by mouth daily. Indications: high blood pressure  Qty: 30 Tab, Refills: 0      ferrous sulfate 325 mg (65 mg iron) tablet Take 1 Tab by mouth daily (with breakfast). Indications: anemia from inadequate iron  Qty: 30 Tab, Refills: 0      lactulose (CHRONULAC) 10 gram/15 mL solution Take 15 mL by mouth two (2) times a day. Indications: hyperammonemia  Qty: 1 Bottle, Refills: 0      pantoprazole (PROTONIX) 40 mg tablet Take 1 Tab by mouth daily. Indications: gastroesophageal reflux disease  Qty: 30 Tab, Refills: 0         CONTINUE these medications which have CHANGED    Details   tamsulosin (FLOMAX) 0.4 mg capsule Take 1 Cap by mouth daily.  Indications: enlarged prostate with urination problem  Qty: 30 Cap, Refills: 0      cloNIDine HCl (CATAPRES) 0.3 mg tablet Take 1 Tab by mouth two (2) times a day. Indications: high blood pressure  Qty: 60 Tab, Refills: 0      lisinopril (PRINIVIL, ZESTRIL) 40 mg tablet Take 1 Tab by mouth daily. Indications: high blood pressure  Qty: 30 Tab, Refills: 0         CONTINUE these medications which have NOT CHANGED    Details   insulin NPH (HUMULIN N NPH U-100 INSULIN) 100 unit/mL injection 60 Units by SubCUTAneous route two (2) times a day. At 9:00 AM and 9:00 PM   Indications: type 2 diabetes mellitus         STOP taking these medications       furosemide (LASIX) 20 mg tablet Comments:   Reason for Stopping:         DULoxetine (CYMBALTA) 60 mg capsule Comments:   Reason for Stopping:               Unresulted Labs (24h ago, onward)    None        To obtain results of studies pending at discharge, please contact [phone]    Follow-up Information     Follow up With Specialties Details Why Contact [facility]  On 6/4/2019 walk in for same day [facility] Monday - Firday -8:00 am to 4:00 pm  Kaiden Mann  [phone]          Advanced Directive:   Does the patient have an appointed surrogate decision maker? No  Does the patient have a Medical Advance Directive? No  Does the patient have a Psychiatric Advance Directive? No  If the patient does not have a surrogate or Medical Advance Directive AND Psychiatric Advance Directive, the patient was offered information on these advance directives Patient declined to complete    Patient Instructions: Please continue all medications until otherwise directed by physician. Tobacco Cessation Discharge Plan:   Is the patient a smoker and needs referral for smoking cessation? Yes  Patient referred to the following for smoking cessation with an appointment? Refused     Patient was offered medication to assist with smoking cessation at discharge?  Refused  Was education for smoking cessation added to the discharge instructions? Yes    Alcohol/Substance Abuse Discharge Plan:   Does the patient have a history of substance/alcohol abuse and requires a referral for treatment? Yes- referred to [facility]   Patient referred to the following for substance/alcohol abuse treatment with an appointment? Yes  Patient was offered medication to assist with alcohol cessation at discharge? No  Was education for substance/alcohol abuse added to discharge instructions? No    Patient discharged to Home; discussed with patient/caregiver and provided to the patient/caregiver either in hard copy or electronically.

## 2019-06-03 NOTE — INTERDISCIPLINARY ROUNDS
Behavioral Health Interdisciplinary Rounds     Patient Name: Alicia Zuluaga  Age: 44 y.o. Room/Bed:  728/  Primary Diagnosis: <principal problem not specified>   Admission Status: Voluntary     Readmission within 30 days: no  Power of  in place: no  Patient requires a blocked bed: no          Reason for blocked bed:     VTE Prophylaxis: No    Mobility needs/Fall risk: no  Flu Vaccine : no   Nutritional Plan: no  Consults:          Labs/Testing due today?: no    Sleep hours:        Participation in Care/Groups:  yes  Medication Compliant?: Yes  PRNS (last 24 hours): Sleep Aid    Restraints (last 24 hours):  no     CIWA (range last 24 hours): CIWA-Ar Total: 0   COWS (range last 24 hours):      Alcohol screening (AUDIT) completed -   AUDIT Score: 8     If applicable, date SBIRT discussed in treatment team AND documented:   AUDIT Screen Score: AUDIT Score: 8      Document Brief Intervention (corresponds directly with the 5 A's, Ask, Advise, Assess, Assist, and Arrange): At- Risk Patients (Score 7-15 for women; 8-15 for men)  Discuss concern patient is drinking at unhealthy levels known to increase risk of alcohol-related health problems. Is Patient ready to commit to change? Yes,     If Yes:   Set goal- stop drinking    Plan: Plan to refer to hte [employer] provided    Tobacco - patient is a smoker: Have You Used Tobacco in the Past 30 Days: Yes  Illegal Drugs use: Have You Used Any Illegal Substances Over the Past 12 Months: Yes    24 hour chart check complete: yes     Patient goal(s) for today:   Treatment team focus/goals: Plan for discharge today. Progress note: Plan for discharge today.      LOS:  5  Expected LOS:TBD    Financial concerns/prescription coverage:  Medicaid   Date of last family contact:      Family requesting physician contact today:    Discharge plan: he will be discharge to a shelter in [facility] in the home:  no       Outpatient provider(s):Corey ESTRELLA     Participating treatment team members: SAMMIE Lyons - Dr. Codie Brown - Mariano Carrasco, PharmD. - Tasha Gagnon RN

## 2019-06-03 NOTE — PROGRESS NOTES
Continues to be non-compliant with diabetic food options and non-receptive to lifestyle modifications for diabetes disease process. Visible on unit. Reports feeling hopeful for discharge tomorrow. Irritable edge at times, however easily verbally redirected.

## 2019-06-03 NOTE — DISCHARGE INSTRUCTIONS
DISCHARGE SUMMARY    NAME:Vamsi Richardson  : 1970  MRN: [id number]    The patient Jose Lopez exhibits the ability to control behavior in a less restrictive environment. Patient's level of functioning is improving. No assaultive/destructive behavior has been observed for the past 24 hours. No suicidal/homicidal threat or behavior has been observed for the past 24 hours. There is no evidence of serious medication side effects. Patient has not been in physical or protective restraints for at least the past 24 hours. If weapons involved, how are they secured? No weapons involved     Is patient aware of and in agreement with discharge plan? Patient is aware of discharge     Arrangements for medication:  Prescription given to patient     Copy of discharge instructions to  provider?: yes, fax to [facility]     Arrangements for transportation home:  Medicaid taxi     Keep all follow up appointments as scheduled, continue to take prescribed medications per physician instructions. Mental health crisis number:  [phone] or your local mental health crisis line number at [phone]. DISCHARGE SUMMARY from Nurse    PATIENT INSTRUCTIONS:        What to do at Home:  Recommended activity: Activity as tolerated,     If you experience any of the following symptoms. Thoughts of harming yourself or others,hopelesness,or depression please follow up with 911 or your local mental health crisis line number at [phone]. .    *  Please give a list of your current medications to your Primary Care Provider. *  Please update this list whenever your medications are discontinued, doses are      changed, or new medications (including over-the-counter products) are added. *  Please carry medication information at all times in case of emergency situations.     These are general instructions for a healthy lifestyle:    No smoking/ No tobacco products/ Avoid exposure to second hand smoke  Surgeon General's Warning: Quitting smoking now greatly reduces serious risk to your health. Obesity, smoking, and sedentary lifestyle greatly increases your risk for illness    A healthy diet, regular physical exercise & weight monitoring are important for maintaining a healthy lifestyle    You may be retaining fluid if you have a history of heart failure or if you experience any of the following symptoms:  Weight gain of 3 pounds or more overnight or 5 pounds in a week, increased swelling in our hands or feet or shortness of breath while lying flat in bed. Please call your doctor as soon as you notice any of these symptoms; do not wait until your next office visit. Recognize signs and symptoms of STROKE:    F-face looks uneven    A-arms unable to move or move unevenly    S-speech slurred or non-existent    T-time-call 911 as soon as signs and symptoms begin-DO NOT go       Back to bed or wait to see if you get better-TIME IS BRAIN. Warning Signs of HEART ATTACK     Call 911 if you have these symptoms:   Chest discomfort. Most heart attacks involve discomfort in the center of the chest that lasts more than a few minutes, or that goes away and comes back. It can feel like uncomfortable pressure, squeezing, fullness, or pain.  Discomfort in other areas of the upper body. Symptoms can include pain or discomfort in one or both arms, the back, neck, jaw, or stomach.  Shortness of breath with or without chest discomfort.  Other signs may include breaking out in a cold sweat, nausea, or lightheadedness. Don't wait more than five minutes to call 911 - MINUTES MATTER! Fast action can save your life. Calling 911 is almost always the fastest way to get lifesaving treatment. Emergency Medical Services staff can begin treatment when they arrive -- up to an hour sooner than if someone gets to the hospital by car. The discharge information has been reviewed with the {PATIENT PARENT GUARDIAN:93829}.   The {PATIENT PARENT GUARDIAN:26188} verbalized understanding. Discharge medications reviewed with the {Dishcarge meds reviewed VRLA:88143} and appropriate educational materials and side effects teaching were provided.   ___________________________________________________________________________________________________________________________________

## 2019-06-03 NOTE — BH NOTES
GROUP THERAPY PROGRESS NOTE    Cynthia Cheng partially participated in the Acute Unit's Process Group with a focus on identifying feelings, planning for the rest of the day, and preparing for discharge. Group time: 60 minutes. Personal goal for participation: To increase the capacity to improve ones mood and structure. Goal orientation: The patient will be able to identify their feelings, develop a plan for structuring their day, and discharge planning. Group therapy participation: With prompting, this patient marginally participated in the group. Therapeutic interventions reviewed and discussed: The group members were asked to introduce themselves to each other and to see if they could identify an emotion they are having and/or let the group know what they want to focus on for the rest of the day as they continue to make discharge plans. Impression of participation: The patient joined the group in the final ten minutes of the session. He said he was feeling, \"Alright. \" He added that he planned to be discharged later today from the hospital and that he was convinced, \"I didn't need to be here. ..except perhaps to have my diabetes treated. \" He brought an educational pamphlet regarding diabetes and read a section from it that emphasized his personal responsibility for taking care of his diabetes management. The patient was alert and generally oriented. He expressed no current SI/HI but seemed in complete denial about his behavioral issues, aside as they relate to diabetes management. His thinking appeared concrete. His affect was non-dysphoric and restricted. His mood matched his affect. He appeared to be in the process of finalizing his discharge plans with the help of nursing. This was the patient's first process group with the undersigned.

## 2019-08-02 ENCOUNTER — OFFICE VISIT (OUTPATIENT)
Dept: FAMILY MEDICINE CLINIC | Age: 49
End: 2019-08-02

## 2019-08-02 VITALS
HEART RATE: 100 BPM | DIASTOLIC BLOOD PRESSURE: 114 MMHG | OXYGEN SATURATION: 98 % | HEIGHT: 66 IN | WEIGHT: 205 LBS | BODY MASS INDEX: 32.95 KG/M2 | SYSTOLIC BLOOD PRESSURE: 164 MMHG | TEMPERATURE: 98.5 F | RESPIRATION RATE: 20 BRPM

## 2019-08-02 DIAGNOSIS — K70.30 ALCOHOLIC CIRRHOSIS OF LIVER WITHOUT ASCITES (HCC): ICD-10-CM

## 2019-08-02 DIAGNOSIS — E72.20 HYPERAMMONEMIA (HCC): ICD-10-CM

## 2019-08-02 DIAGNOSIS — N40.0 BENIGN PROSTATIC HYPERPLASIA, UNSPECIFIED WHETHER LOWER URINARY TRACT SYMPTOMS PRESENT: ICD-10-CM

## 2019-08-02 DIAGNOSIS — F31.9 BIPOLAR 1 DISORDER (HCC): ICD-10-CM

## 2019-08-02 DIAGNOSIS — F33.9 RECURRENT MAJOR DEPRESSIVE DISORDER, REMISSION STATUS UNSPECIFIED (HCC): ICD-10-CM

## 2019-08-02 DIAGNOSIS — I10 ESSENTIAL HYPERTENSION: Primary | ICD-10-CM

## 2019-08-02 DIAGNOSIS — K76.82 HEPATIC ENCEPHALOPATHY: ICD-10-CM

## 2019-08-02 DIAGNOSIS — D69.6 THROMBOCYTOPENIA (HCC): ICD-10-CM

## 2019-08-02 DIAGNOSIS — F11.10 OPIATE ABUSE, CONTINUOUS (HCC): Chronic | ICD-10-CM

## 2019-08-02 DIAGNOSIS — Z79.4 TYPE 2 DIABETES MELLITUS WITH DIABETIC POLYNEUROPATHY, WITH LONG-TERM CURRENT USE OF INSULIN (HCC): ICD-10-CM

## 2019-08-02 DIAGNOSIS — F10.20 ALCOHOLISM (HCC): ICD-10-CM

## 2019-08-02 DIAGNOSIS — K21.9 GASTROESOPHAGEAL REFLUX DISEASE, ESOPHAGITIS PRESENCE NOT SPECIFIED: ICD-10-CM

## 2019-08-02 DIAGNOSIS — E11.42 TYPE 2 DIABETES MELLITUS WITH DIABETIC POLYNEUROPATHY, WITH LONG-TERM CURRENT USE OF INSULIN (HCC): ICD-10-CM

## 2019-08-02 DIAGNOSIS — B18.2 HEP C W/O COMA, CHRONIC (HCC): ICD-10-CM

## 2019-08-02 DIAGNOSIS — Z01.89 ENCOUNTER FOR LABORATORY EXAMINATION: ICD-10-CM

## 2019-08-02 DIAGNOSIS — D64.9 ANEMIA, UNSPECIFIED TYPE: ICD-10-CM

## 2019-08-02 DIAGNOSIS — F19.10 POLYSUBSTANCE ABUSE (HCC): ICD-10-CM

## 2019-08-02 RX ORDER — SPIRONOLACTONE 25 MG/1
TABLET ORAL DAILY
COMMUNITY
End: 2019-08-02 | Stop reason: SDUPTHER

## 2019-08-02 RX ORDER — LANCETS
EACH MISCELLANEOUS
Qty: 200 EACH | Refills: 11 | Status: SHIPPED | OUTPATIENT
Start: 2019-08-02 | End: 2020-01-01 | Stop reason: SDUPTHER

## 2019-08-02 RX ORDER — AMLODIPINE BESYLATE 10 MG/1
10 TABLET ORAL DAILY
Qty: 30 TAB | Refills: 0 | Status: SHIPPED | OUTPATIENT
Start: 2019-08-02 | End: 2019-09-04 | Stop reason: SDUPTHER

## 2019-08-02 RX ORDER — FUROSEMIDE 40 MG/1
TABLET ORAL DAILY
COMMUNITY
End: 2019-08-02

## 2019-08-02 RX ORDER — LISINOPRIL 40 MG/1
40 TABLET ORAL DAILY
Qty: 30 TAB | Refills: 0 | Status: SHIPPED | OUTPATIENT
Start: 2019-08-02 | End: 2019-09-04 | Stop reason: SDUPTHER

## 2019-08-02 RX ORDER — TAMSULOSIN HYDROCHLORIDE 0.4 MG/1
0.4 CAPSULE ORAL DAILY
Qty: 30 CAP | Refills: 0 | Status: SHIPPED | OUTPATIENT
Start: 2019-08-02 | End: 2019-09-04 | Stop reason: SDUPTHER

## 2019-08-02 RX ORDER — INSULIN LISPRO 100 [IU]/ML
6 INJECTION, SOLUTION INTRAVENOUS; SUBCUTANEOUS 3 TIMES DAILY
COMMUNITY
End: 2019-08-02 | Stop reason: ALTCHOICE

## 2019-08-02 RX ORDER — ARIPIPRAZOLE 10 MG/1
10 TABLET ORAL DAILY
COMMUNITY
End: 2020-01-01 | Stop reason: ALTCHOICE

## 2019-08-02 RX ORDER — PANTOPRAZOLE SODIUM 40 MG/1
40 TABLET, DELAYED RELEASE ORAL DAILY
Qty: 30 TAB | Refills: 0 | Status: SHIPPED | OUTPATIENT
Start: 2019-08-02 | End: 2019-09-04 | Stop reason: SDUPTHER

## 2019-08-02 RX ORDER — GABAPENTIN 300 MG/1
300 CAPSULE ORAL 3 TIMES DAILY
COMMUNITY
Start: 2013-07-24 | End: 2019-08-02

## 2019-08-02 RX ORDER — LACTULOSE 10 G/15ML
10 SOLUTION ORAL; RECTAL 2 TIMES DAILY
Qty: 900 ML | Refills: 0 | Status: SHIPPED | OUTPATIENT
Start: 2019-08-02 | End: 2019-09-04 | Stop reason: SDUPTHER

## 2019-08-02 RX ORDER — CLONIDINE HYDROCHLORIDE 0.3 MG/1
0.3 TABLET ORAL 2 TIMES DAILY
Qty: 60 TAB | Refills: 0 | Status: SHIPPED | OUTPATIENT
Start: 2019-08-02 | End: 2019-09-04 | Stop reason: SDUPTHER

## 2019-08-02 RX ORDER — POTASSIUM CHLORIDE 20 MEQ/1
20 TABLET, EXTENDED RELEASE ORAL 2 TIMES DAILY
COMMUNITY
End: 2019-08-02 | Stop reason: ALTCHOICE

## 2019-08-02 RX ORDER — INSULIN PUMP SYRINGE, 3 ML
EACH MISCELLANEOUS
Qty: 1 KIT | Refills: 0 | Status: SHIPPED | OUTPATIENT
Start: 2019-08-02

## 2019-08-02 RX ORDER — DULOXETIN HYDROCHLORIDE 30 MG/1
30 CAPSULE, DELAYED RELEASE ORAL DAILY
COMMUNITY
End: 2019-08-02

## 2019-08-02 RX ORDER — INSULIN LISPRO 100 [IU]/ML
INJECTION, SOLUTION INTRAVENOUS; SUBCUTANEOUS
Qty: 5 PEN | Refills: 0 | Status: SHIPPED | OUTPATIENT
Start: 2019-08-02 | End: 2019-08-08 | Stop reason: ALTCHOICE

## 2019-08-02 RX ORDER — OMEPRAZOLE 20 MG/1
20 TABLET, DELAYED RELEASE ORAL DAILY
COMMUNITY
Start: 2019-07-27 | End: 2019-08-02 | Stop reason: ALTCHOICE

## 2019-08-02 RX ORDER — TRAZODONE HYDROCHLORIDE 100 MG/1
100 TABLET ORAL
COMMUNITY
End: 2020-01-01 | Stop reason: SDUPTHER

## 2019-08-02 RX ORDER — CEPHALEXIN 500 MG/1
500 CAPSULE ORAL EVERY 6 HOURS
COMMUNITY
Start: 2019-07-27 | End: 2019-08-02 | Stop reason: ALTCHOICE

## 2019-08-02 RX ORDER — SPIRONOLACTONE 25 MG/1
25 TABLET ORAL DAILY
Qty: 30 TAB | Refills: 0 | Status: SHIPPED | OUTPATIENT
Start: 2019-08-02 | End: 2019-09-04 | Stop reason: SDUPTHER

## 2019-08-02 RX ORDER — SUCRALFATE 1 G/1
1 TABLET ORAL EVERY 6 HOURS
COMMUNITY
Start: 2019-07-27 | End: 2019-08-02 | Stop reason: ALTCHOICE

## 2019-08-02 RX ORDER — SERTRALINE HYDROCHLORIDE 100 MG/1
100 TABLET, FILM COATED ORAL DAILY
COMMUNITY
End: 2020-01-01 | Stop reason: ALTCHOICE

## 2019-08-02 NOTE — PATIENT INSTRUCTIONS
Sliding Scale Insulin Coverage Mild Dose Glucose 69 or below Eat/drink something sweet followed by a meal  
 
Glucose   
0 units Glucose 150-199  
2 units Glucose 200-249  
4 units Glucose 250-299  
6 units Glucose 300-349  
8 units Glucose 350-399  
10 units Glucose 400 or above 12 units and call PCP if not improved in 30 mins

## 2019-08-02 NOTE — PROGRESS NOTES
Pt here for visit. Labs drawn. Venipuncture done in right hand. Blood specimen obtained. Pt tolerated well. No comps.
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
## 2019-08-03 LAB
25(OH)D3 SERPL-MCNC: 30.4 NG/ML (ref 32–100)
A-G RATIO,AGRAT: 1 RATIO (ref 1.1–2.6)
ALBUMIN SERPL-MCNC: 4.1 G/DL (ref 3.5–5)
ALP SERPL-CCNC: 103 U/L (ref 25–115)
ALT SERPL-CCNC: 27 U/L (ref 5–40)
ANION GAP SERPL CALC-SCNC: 17 MMOL/L
AST SERPL W P-5'-P-CCNC: 35 U/L (ref 10–37)
AVG GLU, 10930: 205 MG/DL (ref 91–123)
BILIRUB SERPL-MCNC: 0.5 MG/DL (ref 0.2–1.2)
BILIRUB UR QL: NEGATIVE
BUN SERPL-MCNC: 20 MG/DL (ref 6–22)
CA OXALATE CRYSTALS,CAOXC: PRESENT
CALCIUM SERPL-MCNC: 10.3 MG/DL (ref 8.4–10.4)
CHLORIDE SERPL-SCNC: 104 MMOL/L (ref 98–110)
CHOLEST SERPL-MCNC: 165 MG/DL (ref 110–200)
CO2 SERPL-SCNC: 26 MMOL/L (ref 20–32)
CREAT SERPL-MCNC: 1 MG/DL (ref 0.5–1.2)
CREATININE, URINE: 128 MG/DL
ERYTHROCYTE [DISTWIDTH] IN BLOOD BY AUTOMATED COUNT: 17.5 % (ref 10–15.5)
FE % SATURATION,PSAT: 8 % (ref 20–50)
FERRITIN SERPL-MCNC: 47 NG/ML (ref 22–322)
FOLATE,FOL: >20 NG/ML
GFRAA, 66117: >60
GFRNA, 66118: >60
GLOBULIN,GLOB: 4.2 G/DL (ref 2–4)
GLUCOSE SERPL-MCNC: 55 MG/DL (ref 70–99)
GLUCOSE UR QL: NEGATIVE MG/DL
HBA1C MFR BLD HPLC: 8.8 % (ref 4.8–5.9)
HCT VFR BLD AUTO: 40.8 % (ref 39.3–51.6)
HDLC SERPL-MCNC: 3.4 MG/DL (ref 0–5)
HDLC SERPL-MCNC: 49 MG/DL (ref 40–59)
HGB BLD-MCNC: 12.3 G/DL (ref 13.1–17.2)
HGB UR QL STRIP: ABNORMAL
HYLINE CAST, 6014: ABNORMAL /LPF
IRON,IRN: 35 MCG/DL (ref 45–160)
KETONES UR QL STRIP.AUTO: NEGATIVE MG/DL
LDLC SERPL CALC-MCNC: 98 MG/DL (ref 50–99)
LEUKOCYTE ESTERASE: NEGATIVE
MCH RBC QN AUTO: 25 PG (ref 26–34)
MCHC RBC AUTO-ENTMCNC: 30 G/DL (ref 31–36)
MCV RBC AUTO: 84 FL (ref 80–95)
MICROALB/CREAT RATIO, 140286: 695.5 MCG/MG OF CREATININE (ref 0–30)
MICROALBUMIN,URINE RANDOM 140054: 890.3 MCG/ML (ref 0.1–17)
NITRITE UR QL STRIP.AUTO: NEGATIVE
PH UR STRIP: 5 PH (ref 5–8)
PLATELET # BLD AUTO: 140 K/UL (ref 140–440)
PMV BLD AUTO: 10.8 FL (ref 9–13)
POTASSIUM SERPL-SCNC: 3.1 MMOL/L (ref 3.5–5.5)
PROT SERPL-MCNC: 8.3 G/DL (ref 6.4–8.3)
PROT UR QL STRIP: ABNORMAL MG/DL
RBC # BLD AUTO: 4.85 M/UL (ref 3.8–5.8)
RBC #/AREA URNS HPF: ABNORMAL /HPF
SODIUM SERPL-SCNC: 147 MMOL/L (ref 133–145)
SP GR UR: 1.02 (ref 1–1.03)
TIBC,TIBC: 424 MCG/DL (ref 228–428)
TRIGL SERPL-MCNC: 93 MG/DL (ref 40–149)
TSH SERPL DL<=0.005 MIU/L-ACNC: 1.36 MCU/ML (ref 0.27–4.2)
UIBC SERPL-MCNC: 389 MCG/DL (ref 110–370)
UROBILINOGEN UR STRIP-MCNC: 2 MG/DL
VIT B12 SERPL-MCNC: 835 PG/ML (ref 211–911)
VLDLC SERPL CALC-MCNC: 19 MG/DL (ref 8–30)
WBC # BLD AUTO: 11 K/UL (ref 4–11)
WBC URNS QL MICRO: ABNORMAL /HPF (ref 0–2)

## 2019-08-05 ENCOUNTER — TELEPHONE (OUTPATIENT)
Dept: FAMILY MEDICINE CLINIC | Age: 49
End: 2019-08-05

## 2019-08-05 NOTE — TELEPHONE ENCOUNTER
[facility] called stating Kiah Alcohol needs to be reordered/recollected. They didn't received a tube for this.

## 2019-08-06 NOTE — TELEPHONE ENCOUNTER
Called patient at this time. No answer. LVM informing patient we need to recollected one of his labs. LVM informing patient to call to the office for lab draw soon as he can

## 2019-08-08 DIAGNOSIS — Z79.4 TYPE 2 DIABETES MELLITUS WITH DIABETIC POLYNEUROPATHY, WITH LONG-TERM CURRENT USE OF INSULIN (HCC): Primary | ICD-10-CM

## 2019-08-08 DIAGNOSIS — E11.42 TYPE 2 DIABETES MELLITUS WITH DIABETIC POLYNEUROPATHY, WITH LONG-TERM CURRENT USE OF INSULIN (HCC): Primary | ICD-10-CM

## 2019-08-08 PROBLEM — K21.9 GASTROESOPHAGEAL REFLUX DISEASE: Status: ACTIVE | Noted: 2019-08-08

## 2019-08-08 PROBLEM — K76.82 ACUTE HEPATIC ENCEPHALOPATHY: Status: RESOLVED | Noted: 2019-05-26 | Resolved: 2019-08-08

## 2019-08-08 PROBLEM — F31.9 BIPOLAR 1 DISORDER, DEPRESSED (HCC): Status: RESOLVED | Noted: 2019-05-29 | Resolved: 2019-08-08

## 2019-08-08 PROBLEM — R41.89 UNRESPONSIVENESS: Status: RESOLVED | Noted: 2019-05-25 | Resolved: 2019-08-08

## 2019-08-08 RX ORDER — INSULIN GLARGINE 100 [IU]/ML
60 INJECTION, SOLUTION SUBCUTANEOUS DAILY
Qty: 5 PEN | Refills: 0 | Status: SHIPPED | OUTPATIENT
Start: 2019-08-08 | End: 2019-09-04 | Stop reason: SDUPTHER

## 2019-08-08 RX ORDER — INSULIN ASPART 100 [IU]/ML
INJECTION, SOLUTION INTRAVENOUS; SUBCUTANEOUS
Qty: 5 PEN | Refills: 0 | Status: SHIPPED | OUTPATIENT
Start: 2019-08-08 | End: 2019-09-04 | Stop reason: SDUPTHER

## 2019-08-08 NOTE — TELEPHONE ENCOUNTER
8/8/2019  5:41 PM    Chief Complaint   Patient presents with    Medication Problem       Will try switching to Lantus insulin instead of NPH and Novolog instead of Humalog.  Sent eRx to pharmacy

## 2019-08-08 NOTE — TELEPHONE ENCOUNTER
Patient called to inform Sally Gordon his Insulin needs authorization. His insurance will not cover it. Patient stated he doesn't have much left.  Please contact for any questions or concerns on this matter when available

## 2019-08-08 NOTE — TELEPHONE ENCOUNTER
8/8/2019  4:40 PM    Chief Complaint   Patient presents with    Medication Problem       Forwarded to clinical staff to call pharmacy or insurance to see if they know preferred for patient. Patient is supposed to be on NPH insulin BID and then a Humalog sliding scale with meals. Need to figure out replacements that are covered.

## 2019-08-09 ENCOUNTER — TELEPHONE (OUTPATIENT)
Dept: FAMILY MEDICINE CLINIC | Age: 49
End: 2019-08-09

## 2019-08-09 NOTE — TELEPHONE ENCOUNTER
Patient called concerning his insulin. Advised patient that the clinical staff will contact back when available to discuss the change of insulin that Quitman Ahumada sent to the pharmacy for him yesterday.   Please be advised

## 2019-08-09 NOTE — TELEPHONE ENCOUNTER
Return call made to pt and no answer. Left message for pt that his insulin was changed due to his insurance would not cover the previous brand he was taking. He should take the same dosage as he was taking before.

## 2019-08-12 ENCOUNTER — TELEPHONE (OUTPATIENT)
Dept: FAMILY MEDICINE CLINIC | Age: 49
End: 2019-08-12

## 2019-08-12 LAB
MISCELLANEOUS TEST,99000: NORMAL
SENT TO, 434: NORMAL
TEST NAME, 435: NORMAL

## 2019-08-12 NOTE — TELEPHONE ENCOUNTER
Patient walked into the office to request a prescription for the Insulin pen needles (screw tops). He has the insulin but did not get the (screw tops). Patient stated he has a few left to use. Advised patient that his provider is not here today but will submit his request and it will be responded to within 24-48hrs.  Please be advised

## 2019-08-22 ENCOUNTER — TELEPHONE (OUTPATIENT)
Dept: FAMILY MEDICINE CLINIC | Age: 49
End: 2019-08-22

## 2019-08-22 NOTE — TELEPHONE ENCOUNTER
Return call made to pt, no answer at this time. Left message for pt to bring his medications to his appointment on 8/23 to be discussed.

## 2019-09-03 ENCOUNTER — TELEPHONE (OUTPATIENT)
Dept: FAMILY MEDICINE CLINIC | Age: 49
End: 2019-09-03

## 2019-09-03 ENCOUNTER — OFFICE VISIT (OUTPATIENT)
Dept: FAMILY MEDICINE CLINIC | Age: 49
End: 2019-09-03

## 2019-09-03 VITALS
TEMPERATURE: 97 F | WEIGHT: 214 LBS | BODY MASS INDEX: 34.39 KG/M2 | DIASTOLIC BLOOD PRESSURE: 88 MMHG | RESPIRATION RATE: 20 BRPM | HEIGHT: 66 IN | OXYGEN SATURATION: 97 % | HEART RATE: 87 BPM | SYSTOLIC BLOOD PRESSURE: 135 MMHG

## 2019-09-03 DIAGNOSIS — E11.42 TYPE 2 DIABETES MELLITUS WITH DIABETIC POLYNEUROPATHY, WITH LONG-TERM CURRENT USE OF INSULIN (HCC): ICD-10-CM

## 2019-09-03 DIAGNOSIS — I10 ESSENTIAL HYPERTENSION: ICD-10-CM

## 2019-09-03 DIAGNOSIS — K70.30 ALCOHOLIC CIRRHOSIS OF LIVER WITHOUT ASCITES (HCC): ICD-10-CM

## 2019-09-03 DIAGNOSIS — Z79.4 TYPE 2 DIABETES MELLITUS WITH DIABETIC POLYNEUROPATHY, WITH LONG-TERM CURRENT USE OF INSULIN (HCC): ICD-10-CM

## 2019-09-03 DIAGNOSIS — N40.0 BENIGN PROSTATIC HYPERPLASIA, UNSPECIFIED WHETHER LOWER URINARY TRACT SYMPTOMS PRESENT: ICD-10-CM

## 2019-09-03 DIAGNOSIS — E72.20 HYPERAMMONEMIA (HCC): ICD-10-CM

## 2019-09-03 DIAGNOSIS — I10 ESSENTIAL HYPERTENSION: Primary | ICD-10-CM

## 2019-09-03 DIAGNOSIS — E11.42 DIABETIC POLYNEUROPATHY ASSOCIATED WITH TYPE 2 DIABETES MELLITUS (HCC): ICD-10-CM

## 2019-09-03 DIAGNOSIS — F11.10 OPIATE ABUSE, CONTINUOUS (HCC): ICD-10-CM

## 2019-09-03 DIAGNOSIS — D69.6 THROMBOCYTOPENIA (HCC): ICD-10-CM

## 2019-09-03 DIAGNOSIS — F33.9 RECURRENT MAJOR DEPRESSIVE DISORDER, REMISSION STATUS UNSPECIFIED (HCC): ICD-10-CM

## 2019-09-03 DIAGNOSIS — B18.2 HEP C W/O COMA, CHRONIC (HCC): ICD-10-CM

## 2019-09-03 DIAGNOSIS — E87.6 HYPOKALEMIA: ICD-10-CM

## 2019-09-03 DIAGNOSIS — R80.9 TYPE 2 DIABETES MELLITUS WITH MICROALBUMINURIA, WITH LONG-TERM CURRENT USE OF INSULIN (HCC): ICD-10-CM

## 2019-09-03 DIAGNOSIS — Z23 ENCOUNTER FOR IMMUNIZATION: ICD-10-CM

## 2019-09-03 DIAGNOSIS — R89.2 ABNORMAL DRUG SCREEN: ICD-10-CM

## 2019-09-03 DIAGNOSIS — K21.9 GASTROESOPHAGEAL REFLUX DISEASE, ESOPHAGITIS PRESENCE NOT SPECIFIED: ICD-10-CM

## 2019-09-03 DIAGNOSIS — D50.9 IRON DEFICIENCY ANEMIA, UNSPECIFIED IRON DEFICIENCY ANEMIA TYPE: ICD-10-CM

## 2019-09-03 DIAGNOSIS — Z79.4 TYPE 2 DIABETES MELLITUS WITH MICROALBUMINURIA, WITH LONG-TERM CURRENT USE OF INSULIN (HCC): ICD-10-CM

## 2019-09-03 DIAGNOSIS — F31.9 BIPOLAR 1 DISORDER (HCC): ICD-10-CM

## 2019-09-03 DIAGNOSIS — K76.82 HEPATIC ENCEPHALOPATHY: ICD-10-CM

## 2019-09-03 DIAGNOSIS — E11.29 TYPE 2 DIABETES MELLITUS WITH MICROALBUMINURIA, WITH LONG-TERM CURRENT USE OF INSULIN (HCC): ICD-10-CM

## 2019-09-03 DIAGNOSIS — I51.7 LVH (LEFT VENTRICULAR HYPERTROPHY): ICD-10-CM

## 2019-09-03 DIAGNOSIS — F10.20 ALCOHOLISM (HCC): ICD-10-CM

## 2019-09-03 DIAGNOSIS — N52.9 ERECTILE DYSFUNCTION, UNSPECIFIED ERECTILE DYSFUNCTION TYPE: ICD-10-CM

## 2019-09-03 DIAGNOSIS — F19.10 POLYSUBSTANCE ABUSE (HCC): ICD-10-CM

## 2019-09-03 RX ORDER — POTASSIUM CHLORIDE 750 MG/1
10 TABLET, EXTENDED RELEASE ORAL DAILY
Qty: 90 TAB | Refills: 0 | Status: SHIPPED | OUTPATIENT
Start: 2019-09-03 | End: 2020-01-01 | Stop reason: SDUPTHER

## 2019-09-03 RX ORDER — SILDENAFIL 25 MG/1
25 TABLET, FILM COATED ORAL AS NEEDED
Qty: 10 TAB | Refills: 1 | Status: SHIPPED | OUTPATIENT
Start: 2019-09-03 | End: 2019-01-01

## 2019-09-03 RX ORDER — LANOLIN ALCOHOL/MO/W.PET/CERES
325 CREAM (GRAM) TOPICAL DAILY
Qty: 90 TAB | Refills: 3 | Status: SHIPPED | OUTPATIENT
Start: 2019-09-03 | End: 2020-01-01 | Stop reason: SDUPTHER
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
## 2019-09-03 NOTE — PROGRESS NOTES
Rex Hodgson is a 44 y.o. male who presents for routine immunizations. He denies any symptoms , reactions or allergies that would exclude them from being immunized today. Risks and adverse reactions were discussed and the VIS was given to them. All questions were addressed. He was observed for 15 min post injection. There were no reactions observed.     Carrie Guerrero LPN

## 2019-09-03 NOTE — PATIENT INSTRUCTIONS
--  -Hide copied text    -Hover for details-  Sliding Scale Insulin Coverage Mild Dose      Glucose 69 or below Eat/drink something sweet followed by a meal      Glucose     0 units      Glucose 150-199    2 units      Glucose 200-249    4 units      Glucose 250-299    6 units      Glucose 300-349    8 units      Glucose 350-399    10 units      Glucose 400 or above    12 units and call PCP if not improved in 30 mins

## 2019-09-04 RX ORDER — TAMSULOSIN HYDROCHLORIDE 0.4 MG/1
0.4 CAPSULE ORAL DAILY
Qty: 90 CAP | Refills: 0 | Status: SHIPPED | OUTPATIENT
Start: 2019-09-04 | End: 2020-01-01 | Stop reason: SDUPTHER

## 2019-09-04 RX ORDER — CLONIDINE HYDROCHLORIDE 0.3 MG/1
0.3 TABLET ORAL 2 TIMES DAILY
Qty: 180 TAB | Refills: 0 | Status: SHIPPED | OUTPATIENT
Start: 2019-09-04 | End: 2020-01-01 | Stop reason: SDUPTHER

## 2019-09-04 RX ORDER — AMLODIPINE BESYLATE 10 MG/1
10 TABLET ORAL DAILY
Qty: 90 TAB | Refills: 0 | Status: SHIPPED | OUTPATIENT
Start: 2019-09-04 | End: 2020-01-01 | Stop reason: ALTCHOICE

## 2019-09-04 RX ORDER — INSULIN ASPART 100 [IU]/ML
INJECTION, SOLUTION INTRAVENOUS; SUBCUTANEOUS
Qty: 15 PEN | Refills: 0 | Status: SHIPPED | OUTPATIENT
Start: 2019-09-04 | End: 2020-01-01 | Stop reason: SDUPTHER

## 2019-09-04 RX ORDER — LACTULOSE 10 G/15ML
10 SOLUTION ORAL; RECTAL 2 TIMES DAILY
Qty: 2700 ML | Refills: 0 | Status: SHIPPED | OUTPATIENT
Start: 2019-09-04 | End: 2020-01-01 | Stop reason: SDUPTHER

## 2019-09-04 RX ORDER — LISINOPRIL 40 MG/1
40 TABLET ORAL DAILY
Qty: 90 TAB | Refills: 0 | Status: SHIPPED | OUTPATIENT
Start: 2019-09-04 | End: 2020-01-01 | Stop reason: SDUPTHER

## 2019-09-04 RX ORDER — SPIRONOLACTONE 25 MG/1
25 TABLET ORAL DAILY
Qty: 90 TAB | Refills: 0 | Status: SHIPPED | OUTPATIENT
Start: 2019-09-04 | End: 2020-01-01 | Stop reason: SDUPTHER

## 2019-09-04 RX ORDER — INSULIN GLARGINE 100 [IU]/ML
60 INJECTION, SOLUTION SUBCUTANEOUS DAILY
Qty: 20 PEN | Refills: 0 | Status: SHIPPED | OUTPATIENT
Start: 2019-09-04 | End: 2020-01-01 | Stop reason: SDUPTHER

## 2019-09-04 RX ORDER — PANTOPRAZOLE SODIUM 40 MG/1
40 TABLET, DELAYED RELEASE ORAL DAILY
Qty: 90 TAB | Refills: 0 | Status: SHIPPED | OUTPATIENT
Start: 2019-09-04 | End: 2020-01-01 | Stop reason: SDUPTHER

## 2019-09-05 PROBLEM — E11.42 DIABETIC POLYNEUROPATHY ASSOCIATED WITH TYPE 2 DIABETES MELLITUS (HCC): Status: ACTIVE | Noted: 2019-09-05

## 2019-09-05 PROBLEM — I51.7 LVH (LEFT VENTRICULAR HYPERTROPHY): Status: ACTIVE | Noted: 2019-09-05

## 2019-09-05 PROBLEM — E11.21 TYPE II DIABETES MELLITUS WITH NEPHROPATHY (HCC): Status: ACTIVE | Noted: 2019-09-05

## 2019-09-05 PROBLEM — E87.6 HYPOKALEMIA: Status: ACTIVE | Noted: 2019-09-05

## 2019-09-05 RX ORDER — INSULIN ASPART 100 [IU]/ML
INJECTION, SOLUTION INTRAVENOUS; SUBCUTANEOUS
Refills: 0 | OUTPATIENT
Start: 2019-09-05

## 2019-09-05 RX ORDER — INSULIN GLARGINE 100 [IU]/ML
INJECTION, SOLUTION SUBCUTANEOUS
Refills: 0 | OUTPATIENT
Start: 2019-09-05
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
## 2019-11-26 NOTE — PROGRESS NOTES
Alondra Galaviz presents today for   Chief Complaint   Patient presents with    Yeast Infection    Labs    Medication Refill     Viagra       Is someone accompanying this pt? no    Is the patient using any DME equipment during OV? no    Depression Screening:  3 most recent PHQ Screens 11/26/2019   PHQ Not Done -   Little interest or pleasure in doing things More than half the days   Feeling down, depressed, irritable, or hopeless More than half the days   Total Score PHQ 2 4   Trouble falling or staying asleep, or sleeping too much More than half the days   Feeling tired or having little energy More than half the days   Poor appetite, weight loss, or overeating More than half the days   Feeling bad about yourself - or that you are a failure or have let yourself or your family down Several days   Trouble concentrating on things such as school, work, reading, or watching TV Several days   Moving or speaking so slowly that other people could have noticed; or the opposite being so fidgety that others notice Not at all   Thoughts of being better off dead, or hurting yourself in some way Not at all   PHQ 9 Score 12   How difficult have these problems made it for you to do your work, take care of your home and get along with others Very difficult       Learning Assessment:  No flowsheet data found. Abuse Screening:  No flowsheet data found. Fall Risk  No flowsheet data found. Health Maintenance reviewed and discussed and ordered per Provider. Health Maintenance Due   Topic Date Due    Pneumococcal 0-64 years (1 of 1 - PPSV23) 02/24/1974    FOOT EXAM Q1  02/24/1978    EYE EXAM RETINAL OR DILATED  02/24/1978    DTaP/Tdap/Td series (1 - Tdap) 02/24/1979    Shingrix Vaccine Age 50> (1 of 2) 02/24/2018    FOBT Q 1 YEAR AGE 50-75  02/24/2018   . Coordination of Care:  1. Have you been to the ER, urgent care clinic since your last visit? Hospitalized since your last visit? no    2.  Have you seen or consulted any other health care providers outside of the [address] [place] [address] since your last visit? Include any pap smears or colon screening.  no

## 2019-12-10 NOTE — PATIENT INSTRUCTIONS
Go to hospital today for possible diabetic ketoacidosis (suspected due to high blood sugar , increased respirations, abdominal pain, and slight confusion) At home, you need to start monitoring blood sugar at home and taking insulin as prescribed! Lantus 60 units every night and novolog sliding scale. Please keep track of blood sugars or bring meter in for me to review at next visit. Sliding Scale Insulin Coverage Mild Dose  
  
Glucose 69 or below Eat/drink something sweet followed by a meal  
  
Glucose    
0 units  
  
Glucose 150-199   
2 units  
  
Glucose 200-249   
4 units  
  
Glucose 250-299   
6 units  
  
Glucose 300-349   
8 units  
  
Glucose 350-399   
10 units  
  
Glucose 400 or above   
12 units and call PCP if not improved in 30 mins  
  
 
  
Diabetic Ketoacidosis (DKA): Care Instructions Your Care Instructions Diabetic ketoacidosis (DKA) happens when the body does not have enough insulin and can't get the sugar it needs for energy. When the body can't use sugar for energy, it starts to use fat for energy. This process makes fatty acids called ketones. The ketones build up in the blood and change the chemical balance in your body. This problem can be very dangerous and needs to be treated. Without treatment, it can lead to a coma or death. DKA occurs most often in people with type 1 diabetes. But people with type 2 diabetes also can get it. DKA can be caused by many things. It can happen if you don't take enough insulin. It can also happen if you have an infection or illness like the flu. Sometimes it happens if you are very dehydrated. DKA can only be treated with insulin and fluids. These are often given in a vein (IV). Follow-up care is a key part of your treatment and safety. Be sure to make and go to all appointments, and call your doctor if you are having problems. It's also a good idea to know your test results and keep a list of the medicines you take. How can you care for yourself at home? To reduce your chance of ketoacidosis: · Take your insulin and other diabetes medicines on time and in the right dose. ? If an infection caused your DKA and your doctor prescribed antibiotics, take them as directed. Do not stop taking them just because you feel better. You need to take the full course of antibiotics. · Test your blood sugar before meals and at bedtime or as often as your doctor advises. This is the best way to know when your blood sugar is high so you can treat it early. Watching for symptoms is not as helpful. This is because you may not have symptoms until your blood sugar is very high. Or you may not notice them. · Teach others at work and at home how to check your blood sugar. Make sure that someone else knows how do it in case you can't. · Wear or carry medical identification at all times. This is very important in case you are too sick or injured to speak for yourself. · Talk to your doctor about when you can start to exercise again. · Eat regular meals that spread your calories and carbohydrate throughout the day. This will help keep your blood sugar steady. · When you are sick: 
? Take your insulin and diabetes medicines. This is important even if you are vomiting and having trouble eating or drinking. Your blood sugar may go up because you are sick. If you are eating less than normal, you may need to change your dose of insulin. Talk with your doctor about a plan when you are well. Then you will know what to do when you are sick. ? Drink extra fluids to prevent dehydration. These include water, broth, and sugar-free drinks. If you don't drink enough, the insulin from your shot may not get into your blood. So your blood sugar may go up. ? Try to eat as you normally do, with a focus on healthy food choices. ? Check your blood sugar at least every 3 to 4 hours.  Check it more often if it's rising fast. If your doctor has told you to take an extra insulin dose for high blood sugar levels (for example, above 240 mg/dL) be sure to take the right amount. If you're not sure how much to take, call your doctor. ? Check your temperature and pulse often. If your temperature goes up, call your doctor. You may be getting worse. ? If you take insulin, check your urine or blood for ketones, especially when you have high blood sugar (for example, above 240 mg/dL). Call your doctor if your ketone level is moderate or high. If you know your blood sugar is high, treat it before it gets worse. · If you missed your usual dose of insulin or other diabetes medicine, take the missed dose or take the amount your doctor told you to take if this happens. · If you and your doctor decide on a dose of extra-fast-acting insulin, give yourself the right dose. If you take insulin and your doctor has not told you how much fast-acting insulin to take based on your blood sugar level, call your doctor. · Drink extra water or sugar-free drinks to prevent dehydration. · Wait 30 minutes after you take extra insulin or missed medicines. Then check your blood sugar again. · If symptoms of high blood sugar get worse or your blood sugar level keeps rising, call your doctor. If you start to feel sleepy or confused, call 911. When should you call for help? Call 911 anytime you think you may need emergency care. For example, call if: 
  · You passed out (lost consciousness).  
  · You are confused or cannot think clearly.  
  · Your blood sugar is very high or very low.  
 Watch closely for changes in your health, and be sure to contact your doctor if: 
  · Your blood sugar stays outside the level your doctor set for you.  
  · You have any problems. Where can you learn more? Go to http://jose-justin.info/.  
Enter U911 in the search box to learn more about \"Diabetic Ketoacidosis (DKA): Care Instructions. \" Current as of: April 16, 2019 Content Version: 12.2 © 9377-0879 [employer]. Care instructions adapted under license by [employer] (which disclaims liability or warranty for this information). If you have questions about a medical condition or this instruction, always ask your healthcare professional. [place] [address] any warranty or liability for your use of this information.
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
## 2019-12-10 NOTE — PROGRESS NOTES
Hilary Wall presents today for Chief Complaint Patient presents with  Follow-up  
  hospital stay  Abdominal Pain  
  occured while at the hospital  
 
 
Is someone accompanying this pt? no 
 
Is the patient using any DME equipment during OV? no 
 
Depression Screening: 
3 most recent PHQ Screens 11/26/2019 PHQ Not Done - Little interest or pleasure in doing things More than half the days Feeling down, depressed, irritable, or hopeless More than half the days Total Score PHQ 2 4 Trouble falling or staying asleep, or sleeping too much More than half the days Feeling tired or having little energy More than half the days Poor appetite, weight loss, or overeating More than half the days Feeling bad about yourself - or that you are a failure or have let yourself or your family down Several days Trouble concentrating on things such as school, work, reading, or watching TV Several days Moving or speaking so slowly that other people could have noticed; or the opposite being so fidgety that others notice Not at all Thoughts of being better off dead, or hurting yourself in some way Not at all PHQ 9 Score 12 How difficult have these problems made it for you to do your work, take care of your home and get along with others Very difficult Learning Assessment: 
No flowsheet data found. Abuse Screening: No flowsheet data found. Fall Risk No flowsheet data found. Health Maintenance reviewed and discussed and ordered per Provider. Health Maintenance Due Topic Date Due  Pneumococcal 0-64 years (1 of 1 - PPSV23) 02/24/1974  
 FOOT EXAM Q1  02/24/1978  
 EYE EXAM RETINAL OR DILATED  02/24/1978  DTaP/Tdap/Td series (1 - Tdap) 02/24/1979  Shingrix Vaccine Age 50> (1 of 2) 02/24/2018  FOBT Q 1 YEAR AGE 50-75  02/24/2018 Nemo Emmanuel Coordination of Care: 1. Have you been to the ER, urgent care clinic since your last visit? Hospitalized since your last visit? [facility] Mosqueda 2. Have you seen or consulted any other health care providers outside of the [address] [place] [address] since your last visit? Include any pap smears or colon screening. Yes-Hospital Doctors

## 2020-01-01 ENCOUNTER — TELEPHONE (OUTPATIENT)
Dept: FAMILY MEDICINE CLINIC | Age: 50
End: 2020-01-01

## 2020-01-01 ENCOUNTER — OFFICE VISIT (OUTPATIENT)
Dept: CARDIOLOGY CLINIC | Age: 50
End: 2020-01-01

## 2020-01-01 ENCOUNTER — OFFICE VISIT (OUTPATIENT)
Dept: FAMILY MEDICINE CLINIC | Age: 50
End: 2020-01-01
Payer: MEDICAID

## 2020-01-01 ENCOUNTER — VIRTUAL VISIT (OUTPATIENT)
Dept: FAMILY MEDICINE CLINIC | Age: 50
End: 2020-01-01

## 2020-01-01 ENCOUNTER — OFFICE VISIT (OUTPATIENT)
Dept: FAMILY MEDICINE CLINIC | Age: 50
End: 2020-01-01

## 2020-01-01 VITALS
SYSTOLIC BLOOD PRESSURE: 84 MMHG | OXYGEN SATURATION: 95 % | BODY MASS INDEX: 28.45 KG/M2 | WEIGHT: 177 LBS | HEIGHT: 66 IN | DIASTOLIC BLOOD PRESSURE: 57 MMHG | TEMPERATURE: 98.4 F | HEART RATE: 101 BPM | RESPIRATION RATE: 24 BRPM

## 2020-01-01 VITALS
RESPIRATION RATE: 24 BRPM | TEMPERATURE: 96.3 F | BODY MASS INDEX: 32.21 KG/M2 | SYSTOLIC BLOOD PRESSURE: 115 MMHG | WEIGHT: 200.4 LBS | HEART RATE: 80 BPM | HEIGHT: 66 IN | OXYGEN SATURATION: 97 % | DIASTOLIC BLOOD PRESSURE: 73 MMHG

## 2020-01-01 VITALS
RESPIRATION RATE: 24 BRPM | SYSTOLIC BLOOD PRESSURE: 155 MMHG | HEART RATE: 74 BPM | BODY MASS INDEX: 28.61 KG/M2 | TEMPERATURE: 98.9 F | WEIGHT: 178 LBS | HEIGHT: 66 IN | OXYGEN SATURATION: 97 % | DIASTOLIC BLOOD PRESSURE: 94 MMHG

## 2020-01-01 VITALS
BODY MASS INDEX: 29.73 KG/M2 | HEART RATE: 72 BPM | DIASTOLIC BLOOD PRESSURE: 104 MMHG | SYSTOLIC BLOOD PRESSURE: 150 MMHG | HEIGHT: 66 IN | WEIGHT: 185 LBS

## 2020-01-01 DIAGNOSIS — E11.42 TYPE 2 DIABETES MELLITUS WITH DIABETIC POLYNEUROPATHY, WITH LONG-TERM CURRENT USE OF INSULIN (HCC): ICD-10-CM

## 2020-01-01 DIAGNOSIS — R80.9 TYPE 2 DIABETES MELLITUS WITH MICROALBUMINURIA, WITH LONG-TERM CURRENT USE OF INSULIN (HCC): ICD-10-CM

## 2020-01-01 DIAGNOSIS — Z79.4 TYPE 2 DIABETES MELLITUS WITH MICROALBUMINURIA, WITH LONG-TERM CURRENT USE OF INSULIN (HCC): ICD-10-CM

## 2020-01-01 DIAGNOSIS — Z76.0 MEDICATION REFILL: ICD-10-CM

## 2020-01-01 DIAGNOSIS — F10.929 ALCOHOLIC INTOXICATION WITH COMPLICATION (HCC): Primary | ICD-10-CM

## 2020-01-01 DIAGNOSIS — R55 SYNCOPE, UNSPECIFIED SYNCOPE TYPE: Primary | ICD-10-CM

## 2020-01-01 DIAGNOSIS — Z13.228 SCREENING FOR ENDOCRINE, METABOLIC AND IMMUNITY DISORDER: Primary | ICD-10-CM

## 2020-01-01 DIAGNOSIS — F31.9 BIPOLAR 1 DISORDER (HCC): ICD-10-CM

## 2020-01-01 DIAGNOSIS — K21.9 GASTROESOPHAGEAL REFLUX DISEASE, ESOPHAGITIS PRESENCE NOT SPECIFIED: ICD-10-CM

## 2020-01-01 DIAGNOSIS — F19.10 POLYSUBSTANCE ABUSE (HCC): ICD-10-CM

## 2020-01-01 DIAGNOSIS — B18.2 HEP C W/O COMA, CHRONIC (HCC): ICD-10-CM

## 2020-01-01 DIAGNOSIS — I82.4Z9 DEEP VEIN THROMBOSIS (DVT) OF DISTAL VEIN OF LOWER EXTREMITY, UNSPECIFIED CHRONICITY, UNSPECIFIED LATERALITY (HCC): ICD-10-CM

## 2020-01-01 DIAGNOSIS — M25.561 PAIN IN BOTH KNEES, UNSPECIFIED CHRONICITY: ICD-10-CM

## 2020-01-01 DIAGNOSIS — I10 ESSENTIAL HYPERTENSION: ICD-10-CM

## 2020-01-01 DIAGNOSIS — R05.9 COUGH: ICD-10-CM

## 2020-01-01 DIAGNOSIS — K04.7 DENTAL ABSCESS: ICD-10-CM

## 2020-01-01 DIAGNOSIS — D50.9 IRON DEFICIENCY ANEMIA, UNSPECIFIED IRON DEFICIENCY ANEMIA TYPE: ICD-10-CM

## 2020-01-01 DIAGNOSIS — Z91.199 NONCOMPLIANCE: ICD-10-CM

## 2020-01-01 DIAGNOSIS — N40.0 BENIGN PROSTATIC HYPERPLASIA, UNSPECIFIED WHETHER LOWER URINARY TRACT SYMPTOMS PRESENT: ICD-10-CM

## 2020-01-01 DIAGNOSIS — E11.21 TYPE II DIABETES MELLITUS WITH NEPHROPATHY (HCC): ICD-10-CM

## 2020-01-01 DIAGNOSIS — Z13.29 SCREENING FOR ENDOCRINE, METABOLIC AND IMMUNITY DISORDER: Primary | ICD-10-CM

## 2020-01-01 DIAGNOSIS — Z79.899 POLYPHARMACY: Primary | ICD-10-CM

## 2020-01-01 DIAGNOSIS — B35.1 ONYCHOMYCOSIS: Primary | ICD-10-CM

## 2020-01-01 DIAGNOSIS — R55 SYNCOPE, UNSPECIFIED SYNCOPE TYPE: ICD-10-CM

## 2020-01-01 DIAGNOSIS — Z79.4 TYPE 2 DIABETES MELLITUS WITH DIABETIC POLYNEUROPATHY, WITH LONG-TERM CURRENT USE OF INSULIN (HCC): ICD-10-CM

## 2020-01-01 DIAGNOSIS — Z23 ENCOUNTER FOR IMMUNIZATION: ICD-10-CM

## 2020-01-01 DIAGNOSIS — K76.82 HEPATIC ENCEPHALOPATHY: ICD-10-CM

## 2020-01-01 DIAGNOSIS — E87.6 HYPOKALEMIA: ICD-10-CM

## 2020-01-01 DIAGNOSIS — I10 ESSENTIAL HYPERTENSION: Primary | ICD-10-CM

## 2020-01-01 DIAGNOSIS — M25.562 PAIN IN BOTH KNEES, UNSPECIFIED CHRONICITY: ICD-10-CM

## 2020-01-01 DIAGNOSIS — E72.20 HYPERAMMONEMIA (HCC): ICD-10-CM

## 2020-01-01 DIAGNOSIS — Z13.0 SCREENING FOR ENDOCRINE, METABOLIC AND IMMUNITY DISORDER: Primary | ICD-10-CM

## 2020-01-01 DIAGNOSIS — E11.29 TYPE 2 DIABETES MELLITUS WITH MICROALBUMINURIA, WITH LONG-TERM CURRENT USE OF INSULIN (HCC): ICD-10-CM

## 2020-01-01 DIAGNOSIS — K70.30 ALCOHOLIC CIRRHOSIS OF LIVER WITHOUT ASCITES (HCC): ICD-10-CM

## 2020-01-01 DIAGNOSIS — J41.0 SMOKERS' COUGH (HCC): ICD-10-CM

## 2020-01-01 DIAGNOSIS — F10.20 ALCOHOLISM (HCC): ICD-10-CM

## 2020-01-01 DIAGNOSIS — I51.7 LVH (LEFT VENTRICULAR HYPERTROPHY): ICD-10-CM

## 2020-01-01 DIAGNOSIS — K70.30 ALCOHOLIC CIRRHOSIS, UNSPECIFIED WHETHER ASCITES PRESENT (HCC): ICD-10-CM

## 2020-01-01 DIAGNOSIS — F33.9 RECURRENT MAJOR DEPRESSIVE DISORDER, REMISSION STATUS UNSPECIFIED (HCC): ICD-10-CM

## 2020-01-01 DIAGNOSIS — F33.9 RECURRENT MAJOR DEPRESSIVE DISORDER, REMISSION STATUS UNSPECIFIED (HCC): Primary | ICD-10-CM

## 2020-01-01 LAB
A-G RATIO,AGRAT: 1.1 RATIO (ref 1.1–2.6)
ABSOLUTE LYMPHOCYTE COUNT, 10803: 1.6 K/UL (ref 1–4.8)
ALBUMIN SERPL-MCNC: 3.9 G/DL (ref 3.5–5)
ALP SERPL-CCNC: 122 U/L (ref 25–115)
ALT SERPL-CCNC: 48 U/L (ref 5–40)
ANION GAP SERPL CALC-SCNC: 22 MMOL/L (ref 3–15)
AST SERPL W P-5'-P-CCNC: 121 U/L (ref 10–37)
AVG GLU, 10930: 173 MG/DL (ref 91–123)
BASOPHILS # BLD: 0 K/UL (ref 0–0.2)
BASOPHILS NFR BLD: 0 % (ref 0–2)
BILIRUB SERPL-MCNC: 2.9 MG/DL (ref 0.2–1.2)
BILIRUB UR QL: ABNORMAL
BUN SERPL-MCNC: 7 MG/DL (ref 6–22)
CALCIUM SERPL-MCNC: 9.3 MG/DL (ref 8.4–10.5)
CHLORIDE SERPL-SCNC: 96 MMOL/L (ref 98–110)
CHOLEST SERPL-MCNC: 131 MG/DL (ref 110–200)
CLARITY: ABNORMAL
CO2 SERPL-SCNC: 24 MMOL/L (ref 20–32)
COLOR UR: ABNORMAL
CREAT SERPL-MCNC: 0.7 MG/DL (ref 0.5–1.2)
CREATININE, URINE: 302 MG/DL
EOSINOPHIL # BLD: 0 K/UL (ref 0–0.5)
EOSINOPHIL NFR BLD: 0 % (ref 0–6)
EPITHELIAL,EPSU: ABNORMAL /HPF (ref 0–2)
ERYTHROCYTE [DISTWIDTH] IN BLOOD BY AUTOMATED COUNT: 15 % (ref 10–15.5)
FE % SATURATION,PSAT: 57 % (ref 20–50)
FERRITIN SERPL-MCNC: 871 NG/ML (ref 22–322)
GFRAA, 66117: >60
GFRNA, 66118: >60
GLOBULIN,GLOB: 3.6 G/DL (ref 2–4)
GLUCOSE SERPL-MCNC: 100 MG/DL (ref 70–99)
GLUCOSE UR QL: ABNORMAL MG/DL
GRANULOCYTES,GRANS: 73 % (ref 40–75)
HBA1C MFR BLD HPLC: 7.6 %
HBA1C MFR BLD HPLC: 7.6 % (ref 4.8–5.6)
HBA1C MFR BLD HPLC: 7.8 %
HCT VFR BLD AUTO: 45.7 % (ref 39.3–51.6)
HDLC SERPL-MCNC: 2.8 MG/DL (ref 0–5)
HDLC SERPL-MCNC: 47 MG/DL
HGB BLD-MCNC: 15.2 G/DL (ref 13.1–17.2)
HGB UR QL STRIP: NEGATIVE
HYLINE CAST, 6014: ABNORMAL /LPF
ICTOTEST - UA HGH, 17034: NEGATIVE
IRON,IRN: 121 MCG/DL (ref 45–160)
KETONES UR QL STRIP.AUTO: NEGATIVE MG/DL
LDL/HDL RATIO,LDHD: 1.3
LDLC SERPL CALC-MCNC: 60 MG/DL (ref 50–99)
LEUKOCYTE ESTERASE: NEGATIVE
LYMPHOCYTES, LYMLT: 14 % (ref 20–45)
MACROCYTOSIS,MACRO: ABNORMAL
MCH RBC QN AUTO: 35 PG (ref 26–34)
MCHC RBC AUTO-ENTMCNC: 33 G/DL (ref 31–36)
MCV RBC AUTO: 106 FL (ref 80–95)
MICROALB/CREAT RATIO, 140286: 380.8 (ref 0–30)
MICROALBUMIN,URINE RANDOM 140054: 1150 MG/L (ref 0.1–17)
MONOCYTES # BLD: 1.4 K/UL (ref 0.1–1)
MONOCYTES NFR BLD: 12 % (ref 3–12)
NEUTROPHILS # BLD AUTO: 8.5 K/UL (ref 1.8–7.7)
NITRITE UR QL STRIP.AUTO: POSITIVE
NON-HDL CHOLESTEROL, 011976: 84 MG/DL
NORMACHROMIC RBC, 868: ABNORMAL
PH UR STRIP: 5 PH (ref 5–8)
PLATELET # BLD AUTO: 86 K/UL (ref 140–440)
PMV BLD AUTO: 10.6 FL (ref 9–13)
POTASSIUM SERPL-SCNC: 2.8 MMOL/L (ref 3.5–5.5)
PROT SERPL-MCNC: 7.5 G/DL (ref 6.4–8.3)
PROT UR QL STRIP: ABNORMAL MG/DL
RBC # BLD AUTO: 4.32 M/UL (ref 3.8–5.8)
RBC #/AREA URNS HPF: NEGATIVE /HPF
SMEAR EVAL, 1131: ABNORMAL
SODIUM SERPL-SCNC: 142 MMOL/L (ref 133–145)
SP GR UR: 1.03 (ref 1–1.03)
TIBC,TIBC: 214 MCG/DL (ref 228–428)
TRIGL SERPL-MCNC: 120 MG/DL (ref 40–149)
TSH SERPL DL<=0.005 MIU/L-ACNC: 1.54 MCU/ML (ref 0.27–4.2)
UIBC SERPL-MCNC: 93 MCG/DL (ref 110–370)
URINE ASCORBIC ACID: NEGATIVE MG/DL
UROBILINOGEN UR STRIP-MCNC: 4 MG/DL
VLDLC SERPL CALC-MCNC: 24 MG/DL (ref 8–30)
WBC # BLD AUTO: 11.7 K/UL (ref 4–11)
WBC URNS QL MICRO: ABNORMAL /HPF (ref 0–2)

## 2020-01-01 PROCEDURE — 83036 HEMOGLOBIN GLYCOSYLATED A1C: CPT | Performed by: NURSE PRACTITIONER

## 2020-01-01 PROCEDURE — 3051F HG A1C>EQUAL 7.0%<8.0%: CPT | Performed by: NURSE PRACTITIONER

## 2020-01-01 PROCEDURE — 99213 OFFICE O/P EST LOW 20 MIN: CPT | Performed by: NURSE PRACTITIONER

## 2020-01-01 PROCEDURE — 90686 IIV4 VACC NO PRSV 0.5 ML IM: CPT | Performed by: NURSE PRACTITIONER

## 2020-01-01 RX ORDER — TAMSULOSIN HYDROCHLORIDE 0.4 MG/1
0.4 CAPSULE ORAL DAILY
Qty: 90 CAP | Refills: 1 | Status: SHIPPED | OUTPATIENT
Start: 2020-01-01 | End: 2020-01-01 | Stop reason: SDUPTHER

## 2020-01-01 RX ORDER — TRAZODONE HYDROCHLORIDE 100 MG/1
100 TABLET ORAL
Qty: 90 TAB | Refills: 1 | Status: SHIPPED | OUTPATIENT
Start: 2020-01-01 | End: 2020-01-01 | Stop reason: SDUPTHER

## 2020-01-01 RX ORDER — LISINOPRIL 40 MG/1
40 TABLET ORAL DAILY
Qty: 90 TAB | Refills: 0 | Status: SHIPPED | OUTPATIENT
Start: 2020-01-01 | End: 2020-01-01 | Stop reason: SDUPTHER

## 2020-01-01 RX ORDER — DULOXETIN HYDROCHLORIDE 60 MG/1
60 CAPSULE, DELAYED RELEASE ORAL DAILY
Status: CANCELLED | OUTPATIENT
Start: 2020-01-01

## 2020-01-01 RX ORDER — ALBUTEROL SULFATE 90 UG/1
1 AEROSOL, METERED RESPIRATORY (INHALATION)
Qty: 1 INHALER | Refills: 1 | Status: SHIPPED | OUTPATIENT
Start: 2020-01-01 | End: 2020-01-01 | Stop reason: SDUPTHER

## 2020-01-01 RX ORDER — LISINOPRIL 40 MG/1
40 TABLET ORAL DAILY
Qty: 90 TAB | Refills: 0 | Status: SHIPPED | OUTPATIENT
Start: 2020-01-01

## 2020-01-01 RX ORDER — GABAPENTIN 300 MG/1
300 CAPSULE ORAL 3 TIMES DAILY
Qty: 180 CAP | Refills: 2 | Status: SHIPPED | OUTPATIENT
Start: 2020-01-01 | End: 2020-01-01 | Stop reason: SDUPTHER

## 2020-01-01 RX ORDER — AMOXICILLIN AND CLAVULANATE POTASSIUM 875; 125 MG/1; MG/1
1 TABLET, FILM COATED ORAL 2 TIMES DAILY
Qty: 20 TAB | Refills: 0 | Status: SHIPPED | OUTPATIENT
Start: 2020-01-01 | End: 2020-01-01

## 2020-01-01 RX ORDER — CITALOPRAM 20 MG/1
20 TABLET, FILM COATED ORAL DAILY
Qty: 90 TAB | Refills: 1 | Status: SHIPPED | OUTPATIENT
Start: 2020-01-01 | End: 2020-01-01 | Stop reason: SDUPTHER

## 2020-01-01 RX ORDER — INSULIN PUMP SYRINGE, 3 ML
EACH MISCELLANEOUS
Qty: 1 KIT | Refills: 0 | Status: CANCELLED | OUTPATIENT
Start: 2020-01-01

## 2020-01-01 RX ORDER — CITALOPRAM 20 MG/1
20 TABLET, FILM COATED ORAL DAILY
Qty: 90 TAB | Refills: 1 | Status: SHIPPED | OUTPATIENT
Start: 2020-01-01

## 2020-01-01 RX ORDER — GABAPENTIN 300 MG/1
300 CAPSULE ORAL 3 TIMES DAILY
Qty: 180 CAP | Refills: 2 | Status: SHIPPED | OUTPATIENT
Start: 2020-01-01

## 2020-01-01 RX ORDER — ALBUTEROL SULFATE 90 UG/1
1 AEROSOL, METERED RESPIRATORY (INHALATION)
Qty: 1 INHALER | Refills: 5 | Status: SHIPPED | OUTPATIENT
Start: 2020-01-01

## 2020-01-01 RX ORDER — LANOLIN ALCOHOL/MO/W.PET/CERES
325 CREAM (GRAM) TOPICAL DAILY
Qty: 90 TAB | Refills: 3 | Status: SHIPPED | OUTPATIENT
Start: 2020-01-01

## 2020-01-01 RX ORDER — ISOSORBIDE MONONITRATE 30 MG/1
30 TABLET, EXTENDED RELEASE ORAL
Qty: 90 TAB | Refills: 3 | Status: CANCELLED | OUTPATIENT
Start: 2020-01-01

## 2020-01-01 RX ORDER — HYDRALAZINE HYDROCHLORIDE 50 MG/1
50 TABLET, FILM COATED ORAL
COMMUNITY
Start: 2020-01-01 | End: 2020-01-01

## 2020-01-01 RX ORDER — POTASSIUM CHLORIDE 750 MG/1
10 TABLET, EXTENDED RELEASE ORAL DAILY
Qty: 90 TAB | Refills: 0 | Status: SHIPPED | OUTPATIENT
Start: 2020-01-01 | End: 2020-01-01 | Stop reason: SDUPTHER

## 2020-01-01 RX ORDER — LACTULOSE 10 G/15ML
10 SOLUTION ORAL; RECTAL 3 TIMES DAILY
Qty: 2700 ML | Refills: 1 | Status: SHIPPED | OUTPATIENT
Start: 2020-01-01

## 2020-01-01 RX ORDER — NADOLOL 20 MG/1
20 TABLET ORAL DAILY
Qty: 90 TAB | Refills: 1 | Status: SHIPPED | OUTPATIENT
Start: 2020-01-01 | End: 2020-01-01

## 2020-01-01 RX ORDER — NIFEDIPINE 30 MG/1
TABLET, FILM COATED, EXTENDED RELEASE ORAL
COMMUNITY
Start: 2020-01-01 | End: 2020-01-01 | Stop reason: SDUPTHER

## 2020-01-01 RX ORDER — LACTULOSE 10 G/15ML
10 SOLUTION ORAL; RECTAL 3 TIMES DAILY
Qty: 2700 ML | Refills: 1 | Status: SHIPPED | OUTPATIENT
Start: 2020-01-01 | End: 2020-01-01 | Stop reason: SDUPTHER

## 2020-01-01 RX ORDER — INSULIN GLARGINE 100 [IU]/ML
60 INJECTION, SOLUTION SUBCUTANEOUS DAILY
Qty: 20 PEN | Refills: 0 | Status: SHIPPED | OUTPATIENT
Start: 2020-01-01

## 2020-01-01 RX ORDER — ARIPIPRAZOLE 10 MG/1
10 TABLET ORAL DAILY
Status: CANCELLED | OUTPATIENT
Start: 2020-01-01

## 2020-01-01 RX ORDER — SPIRONOLACTONE 25 MG/1
25 TABLET ORAL DAILY
Qty: 90 TAB | Refills: 0 | Status: SHIPPED | OUTPATIENT
Start: 2020-01-01 | End: 2020-01-01 | Stop reason: SDUPTHER

## 2020-01-01 RX ORDER — BENZONATATE 200 MG/1
200 CAPSULE ORAL
Qty: 21 CAP | Refills: 0 | Status: SHIPPED | OUTPATIENT
Start: 2020-01-01 | End: 2020-01-01

## 2020-01-01 RX ORDER — CLONIDINE HYDROCHLORIDE 0.3 MG/1
0.3 TABLET ORAL 2 TIMES DAILY
Qty: 180 TAB | Refills: 1 | Status: SHIPPED | OUTPATIENT
Start: 2020-01-01

## 2020-01-01 RX ORDER — DULOXETIN HYDROCHLORIDE 60 MG/1
60 CAPSULE, DELAYED RELEASE ORAL DAILY
COMMUNITY
End: 2020-01-01 | Stop reason: ALTCHOICE

## 2020-01-01 RX ORDER — BUPROPION HYDROCHLORIDE 150 MG/1
150 TABLET, EXTENDED RELEASE ORAL 2 TIMES DAILY
Qty: 180 TAB | Refills: 1 | Status: SHIPPED | OUTPATIENT
Start: 2020-01-01

## 2020-01-01 RX ORDER — CITALOPRAM 20 MG/1
20 TABLET, FILM COATED ORAL DAILY
COMMUNITY
End: 2020-01-01 | Stop reason: SDUPTHER

## 2020-01-01 RX ORDER — LANCETS
EACH MISCELLANEOUS
Qty: 200 EACH | Refills: 11 | Status: SHIPPED | OUTPATIENT
Start: 2020-01-01

## 2020-01-01 RX ORDER — NADOLOL 20 MG/1
20 TABLET ORAL DAILY
Qty: 90 TAB | Refills: 1 | Status: SHIPPED | OUTPATIENT
Start: 2020-01-01 | End: 2020-01-01 | Stop reason: SDUPTHER

## 2020-01-01 RX ORDER — SPIRONOLACTONE 25 MG/1
25 TABLET ORAL DAILY
Qty: 90 TAB | Refills: 1 | Status: SHIPPED | OUTPATIENT
Start: 2020-01-01 | End: 2020-01-01

## 2020-01-01 RX ORDER — ARIPIPRAZOLE 5 MG/1
TABLET ORAL DAILY
COMMUNITY
End: 2020-01-01

## 2020-01-01 RX ORDER — BUPROPION HYDROCHLORIDE 150 MG/1
150 TABLET, EXTENDED RELEASE ORAL DAILY
Status: CANCELLED | OUTPATIENT
Start: 2020-01-01

## 2020-01-01 RX ORDER — LANCETS
EACH MISCELLANEOUS
Qty: 200 EACH | Refills: 11 | Status: SHIPPED | OUTPATIENT
Start: 2020-01-01 | End: 2020-01-01 | Stop reason: SDUPTHER

## 2020-01-01 RX ORDER — ARIPIPRAZOLE 5 MG/1
TABLET ORAL DAILY
Status: CANCELLED | OUTPATIENT
Start: 2020-01-01

## 2020-01-01 RX ORDER — AMLODIPINE BESYLATE 10 MG/1
10 TABLET ORAL DAILY
Qty: 90 TAB | Refills: 0 | Status: CANCELLED | OUTPATIENT
Start: 2020-01-01

## 2020-01-01 RX ORDER — INSULIN GLARGINE 100 [IU]/ML
60 INJECTION, SOLUTION SUBCUTANEOUS DAILY
Qty: 20 PEN | Refills: 0 | Status: SHIPPED | OUTPATIENT
Start: 2020-01-01 | End: 2020-01-01 | Stop reason: SDUPTHER

## 2020-01-01 RX ORDER — ISOSORBIDE MONONITRATE 30 MG/1
30 TABLET, EXTENDED RELEASE ORAL
Qty: 90 TAB | Refills: 3 | Status: SHIPPED | OUTPATIENT
Start: 2020-01-01 | End: 2020-01-01

## 2020-01-01 RX ORDER — TRAZODONE HYDROCHLORIDE 100 MG/1
100 TABLET ORAL
COMMUNITY
End: 2020-01-01 | Stop reason: SDUPTHER

## 2020-01-01 RX ORDER — BUPROPION HYDROCHLORIDE 150 MG/1
TABLET, EXTENDED RELEASE ORAL 2 TIMES DAILY
COMMUNITY
End: 2020-01-01 | Stop reason: SDUPTHER

## 2020-01-01 RX ORDER — CLONIDINE HYDROCHLORIDE 0.3 MG/1
0.3 TABLET ORAL 2 TIMES DAILY
Qty: 180 TAB | Refills: 0 | Status: SHIPPED | OUTPATIENT
Start: 2020-01-01 | End: 2020-01-01 | Stop reason: SDUPTHER

## 2020-01-01 RX ORDER — INSULIN ASPART 100 [IU]/ML
INJECTION, SOLUTION INTRAVENOUS; SUBCUTANEOUS
Qty: 15 PEN | Refills: 0 | Status: SHIPPED | OUTPATIENT
Start: 2020-01-01 | End: 2020-01-01 | Stop reason: SDUPTHER

## 2020-01-01 RX ORDER — TAMSULOSIN HYDROCHLORIDE 0.4 MG/1
0.4 CAPSULE ORAL DAILY
Qty: 90 CAP | Refills: 1 | Status: SHIPPED | OUTPATIENT
Start: 2020-01-01

## 2020-01-01 RX ORDER — NIFEDIPINE 30 MG/1
30 TABLET, FILM COATED, EXTENDED RELEASE ORAL DAILY
Qty: 90 TAB | Refills: 1 | Status: SHIPPED | OUTPATIENT
Start: 2020-01-01 | End: 2020-01-01

## 2020-01-01 RX ORDER — AMLODIPINE BESYLATE 10 MG/1
10 TABLET ORAL DAILY
Qty: 90 TAB | Refills: 1 | Status: SHIPPED | OUTPATIENT
Start: 2020-01-01

## 2020-01-01 RX ORDER — LANOLIN ALCOHOL/MO/W.PET/CERES
325 CREAM (GRAM) TOPICAL DAILY
Qty: 90 TAB | Refills: 3 | Status: SHIPPED | OUTPATIENT
Start: 2020-01-01 | End: 2020-01-01 | Stop reason: SDUPTHER

## 2020-01-01 RX ORDER — ACETAMINOPHEN 500 MG
1 TABLET ORAL DAILY
Qty: 1 KIT | Refills: 0 | Status: SHIPPED | OUTPATIENT
Start: 2020-01-01

## 2020-01-01 RX ORDER — AMLODIPINE BESYLATE 10 MG/1
TABLET ORAL DAILY
COMMUNITY
End: 2020-01-01 | Stop reason: SDUPTHER

## 2020-01-01 RX ORDER — BUPROPION HYDROCHLORIDE 150 MG/1
150 TABLET, EXTENDED RELEASE ORAL 2 TIMES DAILY
Qty: 180 TAB | Refills: 1 | Status: SHIPPED | OUTPATIENT
Start: 2020-01-01 | End: 2020-01-01 | Stop reason: SDUPTHER

## 2020-01-01 RX ORDER — INSULIN ASPART 100 [IU]/ML
INJECTION, SOLUTION INTRAVENOUS; SUBCUTANEOUS
Qty: 15 PEN | Refills: 0 | Status: SHIPPED | OUTPATIENT
Start: 2020-01-01

## 2020-01-01 RX ORDER — TRAZODONE HYDROCHLORIDE 100 MG/1
100 TABLET ORAL
Qty: 90 TAB | Refills: 1 | Status: SHIPPED | OUTPATIENT
Start: 2020-01-01

## 2020-01-01 RX ORDER — POTASSIUM CHLORIDE 750 MG/1
10 TABLET, EXTENDED RELEASE ORAL DAILY
Qty: 90 TAB | Refills: 1 | Status: SHIPPED | OUTPATIENT
Start: 2020-01-01

## 2020-01-01 RX ORDER — BUPROPION HYDROCHLORIDE 150 MG/1
150 TABLET, EXTENDED RELEASE ORAL DAILY
COMMUNITY
End: 2020-01-01 | Stop reason: ALTCHOICE

## 2020-01-01 RX ORDER — DULOXETIN HYDROCHLORIDE 60 MG/1
60 CAPSULE, DELAYED RELEASE ORAL DAILY
COMMUNITY
End: 2020-01-01

## 2020-01-01 RX ORDER — PANTOPRAZOLE SODIUM 40 MG/1
40 TABLET, DELAYED RELEASE ORAL DAILY
Qty: 90 TAB | Refills: 1 | Status: SHIPPED | OUTPATIENT
Start: 2020-01-01

## 2020-01-31 NOTE — PATIENT INSTRUCTIONS
Sliding Scale Insulin Coverage Mild Dose Moderate Dose High Dose Glucose 69 or lower Eat/drink Eat/drink Eat/drink Glucose 70-79  
0 units  
0 units  
0 units Glucose   
8 units 12 units 16 units Glucose 101-150  
12 units 16 units 20 units Glucose 151-200  
16 units 20 units 24 units Glucose 201-250  
20 units 24 units 28 units Glucose 251-300  
24 units 28 units 32 units Glucose 301-400  
28 units 32 units 36 units Glucose 401 or above 32 units and call PCP if not improved in 30 mins 36 units and call PCP if not improved in 30 mins 40 units and call PCP if not improved in 30 mins Follow up with GI doctor- You have been referred to: [address] [address] [address] [address] Phone: [phone] Fax: [phone] Referral made to Cardiologist today- follow up with them ASAP for medication reconciliation and syncope Follow up with Psych Doctor at [facility] regarding current medications and medication reconciliation
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
## 2020-02-19 NOTE — TELEPHONE ENCOUNTER
Patient called requesting refill on Gabapentin. He states this was prescribed in the hospital due to diabetic nerve pain. Patient states he needs refills on \"all his medications\" He listed a couple that he couldn't pronounce, that weren't on his list. Please call pt to clarify. Requested Prescriptions     Pending Prescriptions Disp Refills    cloNIDine HCL (CATAPRES) 0.3 mg tablet 180 Tab 0     Sig: Take 1 Tab by mouth two (2) times a day. Indications: high blood pressure    ferrous sulfate 325 mg (65 mg iron) tablet 90 Tab 3     Sig: Take 1 Tab by mouth daily.  citalopram (CELEXA) 20 mg tablet       Sig: Take 1 Tab by mouth daily.  buPROPion SR (WELLBUTRIN SR) 150 mg SR tablet       Sig: Take 1 Tab by mouth daily.  insulin glargine (LANTUS,BASAGLAR) 100 unit/mL (3 mL) inpn 20 Pen 0     Si Units by SubCUTAneous route daily.  lancets misc 200 Each 11     Sig: Check glucose four times daily- before meals and at bedtime.  *BRAND PER INSURANCE*

## 2020-02-24 NOTE — TELEPHONE ENCOUNTER
Patient called back to check the status of refills:    Gabapentin (diabetic nerve pain)  Procardia   Benzonitrate (dry smoker's cough)  Requested Prescriptions     Pending Prescriptions Disp Refills    cloNIDine HCL (CATAPRES) 0.3 mg tablet 180 Tab 0     Sig: Take 1 Tab by mouth two (2) times a day. Indications: high blood pressure    ferrous sulfate 325 mg (65 mg iron) tablet 90 Tab 3     Sig: Take 1 Tab by mouth daily.  citalopram (CELEXA) 20 mg tablet       Sig: Take 1 Tab by mouth daily.  buPROPion SR (WELLBUTRIN SR) 150 mg SR tablet       Sig: Take 1 Tab by mouth daily.  insulin glargine (LANTUS,BASAGLAR) 100 unit/mL (3 mL) inpn 20 Pen 0     Si Units by SubCUTAneous route daily.  lancets misc 200 Each 11     Sig: Check glucose four times daily- before meals and at bedtime. *BRAND PER INSURANCE*    potassium chloride (KLOR-CON) 10 mEq tablet 90 Tab 0     Sig: Take 1 Tab by mouth daily.  ARIPiprazole (ABILIFY) 10 mg tablet       Sig: Take 1 Tab by mouth daily.  DULoxetine (CYMBALTA) 60 mg capsule       Sig: Take 1 Cap by mouth daily.  amLODIPine (NORVASC) 10 mg tablet 90 Tab 0     Sig: Take 1 Tab by mouth daily. Indications: high blood pressure    spironolactone (ALDACTONE) 25 mg tablet 90 Tab 0     Sig: Take 1 Tab by mouth daily.     insulin aspart U-100 (NOVOLOG) 100 unit/mL (3 mL) inpn 15 Pen 0     Sig: Sliding scale insulin before meals- max 36 units in 24 hours

## 2020-02-25 PROBLEM — D69.6 THROMBOCYTOPENIA (HCC): Status: RESOLVED | Noted: 2019-05-26 | Resolved: 2020-01-01

## 2020-02-25 NOTE — TELEPHONE ENCOUNTER
Spoke with patient regarding medications. Let him know I refilled most he was requesting and sent to pharmacy. He will have to come to office for written Rx of gabapentin. In locked nurses station cabinet. I strongly advised patient to follow up with cardiology in regards to polypharmacy and to establish which BP meds are appropriate and any further cardiac testing needed. Also strongly encouraged to follow up with psych regarding psych meds.  For now I will refill celexa and trazadone as this is what he was discharged on last.

## 2020-02-27 NOTE — PATIENT INSTRUCTIONS
Stop taking Nifedipine Stop taking Isordil Begin taking Imdur 30 mg/ day After the recommended changes have been made in blood pressure medicines, patient advised to keep BP/HR(pulse rate) chart twice daily and bring us results in next 1 week or so. Patient may send the results via \"My Chart\" if desired. Please rest for 5-10 minutes before checking blood pressure. Sit on a comfortable chair without crossing the legs and put your arm on a table. We recommend that you use an upper arm cuff. Check the blood pressure 3 times weekly and take the lowest reading. If you check the blood pressure in both arms, use the higher reading. Learning About Benefits From Quitting Smoking How does quitting smoking make you healthier? If you're thinking about quitting smoking, you may have a few reasons to be smoke-free. Your health may be one of them. · When you quit smoking, you lower your risks for cancer, lung disease, heart attack, stroke, blood vessel disease, and blindness from macular degeneration. · When you're smoke-free, you get sick less often, and you heal faster. You are less likely to get colds, flu, bronchitis, and pneumonia. · As a nonsmoker, you may find that your mood is better and you are less stressed. When and how will you feel healthier? Quitting has real health benefits that start from day 1 of being smoke-free. And the longer you stay smoke-free, the healthier you get and the better you feel. The first hours · After just 20 minutes, your blood pressure and heart rate go down. That means there's less stress on your heart and blood vessels. · Within 12 hours, the level of carbon monoxide in your blood drops back to normal. That makes room for more oxygen. With more oxygen in your body, you may notice that you have more energy than when you smoked. After 2 weeks · Your lungs start to work better. · Your risk of heart attack starts to drop. After 1 month · When your lungs are clear, you cough less and breathe deeper, so it's easier to be active. · Your sense of taste and smell return. That means you can enjoy food more than you have since you started smoking. Over the years · After 1 year, your risk of heart disease is half what it would be if you kept smoking. · After 5 years, your risk of stroke starts to shrink. Within a few years after that, it's about the same as if you'd never smoked. · After 10 years, your risk of dying from lung cancer is cut by about half. And your risk for many other types of cancer is lower too. How would quitting help others in your life? When you quit smoking, you improve the health of everyone who now breathes in your smoke. · Their heart, lung, and cancer risks drop, much like yours. · They are sick less. For babies and small children, living smoke-free means they're less likely to have ear infections, pneumonia, and bronchitis. · If you're a woman who is or will be pregnant someday, quitting smoking means a healthier . · Children who are close to you are less likely to become adult smokers. Where can you learn more? Go to http://jose-justin.info/. Enter [id number] [id number] in the search box to learn more about \"Learning About Benefits From Quitting Smoking. \" Current as of: 2018 Content Version: 12.2 © 0828-0671 [employer]. Care instructions adapted under license by [employer] (which disclaims liability or warranty for this information). If you have questions about a medical condition or this instruction, always ask your healthcare professional. [place] [address] any warranty or liability for your use of this information.

## 2020-02-27 NOTE — PROGRESS NOTES
HISTORY OF PRESENT ILLNESS Carolyn Keith is a 44 y.o. male. Patient seen for syncope and polypharmacy for HTN. New Patient The history is provided by the patient and medical records. Pertinent negatives include no chest pain and no shortness of breath. Dizziness The history is provided by the patient and medical records. This is a recurrent problem. The current episode started more than 2 days ago. Pertinent negatives include no chest pain and no shortness of breath. Hypertension The history is provided by the patient and medical records. This is a chronic problem. The problem occurs constantly. The problem has not changed since onset. Pertinent negatives include no chest pain and no shortness of breath. Review of Systems Constitutional: Negative for malaise/fatigue. HENT: Negative for congestion. Eyes: Negative for double vision and redness. Respiratory: Negative for cough, shortness of breath and wheezing. Cardiovascular: Negative for chest pain, palpitations, orthopnea, claudication, leg swelling and PND. Gastrointestinal: Negative for nausea and vomiting. Musculoskeletal: Negative for falls. Neurological: Positive for dizziness. Endo/Heme/Allergies: Does not bruise/bleed easily. Physical Exam 
Nursing note reviewed. Exam conducted with a chaperone present. Constitutional:   
   Appearance: Normal appearance. HENT:  
   Head: Normocephalic. Neck: Musculoskeletal: Normal range of motion and neck supple. Cardiovascular:  
   Rate and Rhythm: Normal rate and regular rhythm. Pulses: Normal pulses. Heart sounds: Normal heart sounds. No murmur. No friction rub. No gallop. Pulmonary:  
   Effort: Pulmonary effort is normal. No respiratory distress. Breath sounds: Normal breath sounds. No stridor. No wheezing, rhonchi or rales. Chest:  
   Chest wall: No tenderness. Abdominal:  
   General: There is no distension. Palpations: Abdomen is soft. Musculoskeletal: Normal range of motion. Skin: 
   General: Skin is warm and dry. Neurological:  
   General: No focal deficit present. Mental Status: He is alert. Psychiatric:     
   Mood and Affect: Mood normal.  
 
 
11/2019 Echo NORMAL LEFT VENTRICULAR CAVITY SIZE AND SYSTOLIC FUNCTION WITH AN EJECTION FRACTION OF  60 %. LEFT VENTRICULAR HYPERTROPHY PRESENT. ABNORMAL DIASTOLIC FILLING PATTERN. MITRAL ANNULAR CALCIFICATION WITH TRACE MITRAL REGURGITATION. SCLEROTIC TRILEAFLET AORTIC VALVE WITHOUT EVIDENCE OF STENOSIS. MILD TRICUSPID REGURGITATION WITH A RVSP OF 45 MMHG. STRUCTURALLY NORMAL PULMONIC VALVE. NO EVIDENCE OF PERICARDIAL EFFUSION. NO MASSES, SHUNTS OR THROMBI SEEN. COMPARED TO PREVIOUS ECHO PULMONARY HYPERTENSION IS NEW. ASSESSMENT and PLAN Mr. Sarita Garcia has a reminder for a \"due or due soon\" health maintenance. I have asked that he contact his primary care provider for follow-up on this health maintenance. No flowsheet data found. Assessment I have personally reviewed patients ekg done at other facility. I Have personally reviewed recent relevant labs available and discussed with patient ICD-10-CM ICD-9-CM 1. Syncope, unspecified syncope type R55 780.2 2. Essential hypertension I10 401.9 isosorbide mononitrate ER (IMDUR) 30 mg tablet 3. LVH (left ventricular hypertrophy) I51.7 429.3 4. Polysubstance abuse ([facility] [address] F19.10 305.90   
5. Type II diabetes mellitus with nephropathy (HCC) E11.21 250.40   
  583.81   
6. Hep C w/o coma, chronic [facility]) B18.2 070.54   
 
2/2020 Patient referred for syncope. He reports syncopal episodes with position change from sitting to standing. This is likely due to polypharmacy. Episodes occurred  after mid-day dose of medication that has now been discontinued (does not know name). Orthostatics checked in office today - b/p increased with standing.   Medications reviewed, will d/c nifedipine and continue Amlodipine. D/C Isordil, and begin Imdur 30 mg/ day. Follow b/p chart at home and bring to f/u appt. Medications Discontinued During This Encounter Medication Reason  NIFEdipine ER (ADALAT CC) 30 mg ER tablet Other  isosorbide dinitrate (ISORDIL) 10 mg tablet Other Orders Placed This Encounter  isosorbide mononitrate ER (IMDUR) 30 mg tablet Sig: Take 1 Tab by mouth every morning. Dispense:  90 Tab Refill:  3 Follow-up and Dispositions · Return in about 4 weeks (around 3/26/2020). I have independently evaluated and examined the patient. All relevant labs and testing data's are reviewed. Care plan discussed and updated after review.  
 
iMsha Spring MD

## 2020-03-17 NOTE — TELEPHONE ENCOUNTER
Patient called requesting refill on ALL his medication    Requested Prescriptions     Pending Prescriptions Disp Refills    albuterol (PROVENTIL HFA, VENTOLIN HFA, PROAIR HFA) 90 mcg/actuation inhaler 1 Inhaler 1     Sig: Take 1 Puff by inhalation every four (4) hours as needed for Wheezing, Shortness of Breath or Cough.  amLODIPine (Norvasc) 10 mg tablet       Sig: Take  by mouth daily.  apixaban (ELIQUIS) 5 mg tablet 180 Tab 1     Sig: Take 1 Tab by mouth two (2) times a day.  ARIPiprazole (ABILIFY) 5 mg tablet       Sig: Take  by mouth daily.  Blood-Glucose Meter monitoring kit 1 Kit 0     Sig: Check glucose four times daily- before meals and at bedtime. *BRAND PER INSURANCE*    buPROPion SR (Wellbutrin SR) 150 mg SR tablet       Sig: Take  by mouth two (2) times a day.  citalopram (CELEXA) 20 mg tablet 90 Tab 1     Sig: Take 1 Tab by mouth daily.  cloNIDine HCL (CATAPRES) 0.3 mg tablet 180 Tab 0     Sig: Take 1 Tab by mouth two (2) times a day. Indications: high blood pressure    DULoxetine (CYMBALTA) 60 mg capsule       Sig: Take 1 Cap by mouth daily.  ferrous sulfate 325 mg (65 mg iron) tablet 90 Tab 3     Sig: Take 1 Tab by mouth daily.  gabapentin (NEURONTIN) 300 mg capsule 180 Cap 2     Sig: Take 1 Cap by mouth three (3) times daily. Max Daily Amount: 900 mg.    glucose blood VI test strips (blood glucose test) strip 200 Strip 11     Sig: Check glucose four times daily- before meals and at bedtime. *BRAND PER INSURANCE* patient thinks he has one touch.  insulin aspart U-100 (NOVOLOG) 100 unit/mL (3 mL) inpn 15 Pen 0     Sig: Sliding scale insulin before meals- max 36 units in 24 hours    insulin glargine (LANTUS,BASAGLAR) 100 unit/mL (3 mL) inpn 20 Pen 0     Si Units by SubCUTAneous route daily.  isosorbide mononitrate ER (IMDUR) 30 mg tablet 90 Tab 3     Sig: Take 1 Tab by mouth every morning.     lactulose (CHRONULAC) 10 gram/15 mL solution 2700 mL 1     Sig: Take 15 mL by mouth three (3) times daily.  lancets misc 200 Each 11     Sig: Check glucose four times daily- before meals and at bedtime. *BRAND PER INSURANCE*    lisinopriL (PRINIVIL, ZESTRIL) 40 mg tablet 90 Tab 0     Sig: Take 1 Tab by mouth daily. Indications: high blood pressure    nadoloL (CORGARD) 20 mg tablet 90 Tab 1     Sig: Take 1 Tab by mouth daily.  pantoprazole (PROTONIX) 40 mg tablet 90 Tab 0     Sig: Take 1 Tab by mouth daily.  potassium chloride (KLOR-CON) 10 mEq tablet 90 Tab 0     Sig: Take 1 Tab by mouth daily.  spironolactone (ALDACTONE) 25 mg tablet 90 Tab 0     Sig: Take 1 Tab by mouth daily.  tamsulosin (FLOMAX) 0.4 mg capsule 90 Cap 0     Sig: Take 1 Cap by mouth daily. Indications: enlarged prostate with urination problem    traZODone (DESYREL) 100 mg tablet 90 Tab 1     Sig: Take 1 Tab by mouth nightly.

## 2020-03-17 NOTE — TELEPHONE ENCOUNTER
Please see message    Patient has had the majority of these filled on 2-. The only ones he may need refills on are the ones prescribed by a Historic Provider:  Cymbalta, Wellbutrin, Abilify and Amlodipine. Flomax was last filled on 9-4-19  Protonix filled on 9-4-19. .. both filled by Michael Ohara    Thank you

## 2020-03-17 NOTE — TELEPHONE ENCOUNTER
Refilled all medications at this time though he should have refills available at pharmacy. He will have to come to office to  gabapentin rx. Others sent to [facility]. Imdur is new medication from cardiology, they sent in plenty of refills. Check with Martina. I have indicated on the directions of his pill bottle to follow up with either heart or psych doctor regarding certain medications.

## 2020-03-18 NOTE — TELEPHONE ENCOUNTER
Made contact with the patient advising all meds were refilled and Gabapentin is here at the office to be picked up.   Patient verbalized understanding

## 2020-04-06 NOTE — TELEPHONE ENCOUNTER
Spoke to patient in regards to his appointment scheduled on 5-1-20. Per patient he has cell phone and accepted the change to virtual video appointment.   Patient verbalized understanding

## 2020-04-20 NOTE — TELEPHONE ENCOUNTER
Requested Prescriptions     Pending Prescriptions Disp Refills    insulin glargine (LANTUS,BASAGLAR) 100 unit/mL (3 mL) inpn 20 Pen 0     Si Units by SubCUTAneous route daily.

## 2020-07-01 NOTE — TELEPHONE ENCOUNTER
Martina Pharmacist called asking if the Wellbutrin was a medication that we are managing for the patient or is it be managed by [facility]?   She has a note on a pending refill to double check with the provider

## 2020-07-02 NOTE — TELEPHONE ENCOUNTER
Attempted to call patient to clarify  [phone] number with no answer, full mail box  [phone] number with no answer and voicemail of ronnell ramos- no voicemail left as not appropriate as this clearly not patient  [phone] number and reached patient. He still does not know what medicines he should be on for HTN. He cannot tell me what he is presently taking and is not at home to look at pill bottles. He is still falling. He does not know what his BP has been running. He has no upcoming appointment with cardiology. Note from cardiology 2/27/2020 syncope likely due to polypharmacy as I suspected. Per Dr. Dharmesh Garnett nifedipine, continue amlodipine. DC isordil and begin Imdur 30 mg/day. He was to follow b/p chart at home and bring to follow up, instructions for follow up in 4 weeks (3/26/2020). Patient states he was told 'nothing' from cardiology. He is unsure if he is following those directions. I had very loni conversation with patient that it is difficult for me to decide what medicines to refill if he is not even sure what he is taking and if it is safe. He states he will follow up with cardiology. For psych medications he states he never followed up with [facility] as he and that provider 'did not see eye to eye' as he wanted to DC gabapenitn, patient refused. He needs new psych for med management, will attempt to refer today. Spoke with pharmacist to relay the above.

## 2020-07-27 NOTE — TELEPHONE ENCOUNTER
Not safe medication as patient already taking too many pills that lower BP and this medication will also lower BP- needs clearance from cardiology before can be prescribed.   Avoid alcohol and cigarettes as these can worsen erectile dysfunction

## 2020-07-27 NOTE — TELEPHONE ENCOUNTER
Please see message    Spoke with Mr. Keyshawn Cooper, he would like a prescription for Viagra or Cialis. (Wants the generic) He states that he has gotten it from us before-    Looks like the last fill was 9-3-2019   #10 X 1    Thank you

## 2020-07-28 NOTE — TELEPHONE ENCOUNTER
Please see message    FYI   Patient is demanding to speak to you about this-he \"cannot go on like this\"  Doesn't even know who his cardiologist is and as for taking medications he is not takiing half of what is prescribed.   Set up a virtual for Friday:)
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
## 2020-08-17 NOTE — PROGRESS NOTES
Saskia Haq presents today for Chief Complaint Patient presents with  
 Other  
  fungus  Mouth Pain Is someone accompanying this pt? no 
 
Is the patient using any DME equipment during OV? no 
 
Depression Screening: 
3 most recent PHQ Screens 11/26/2019 PHQ Not Done - Little interest or pleasure in doing things More than half the days Feeling down, depressed, irritable, or hopeless More than half the days Total Score PHQ 2 4 Trouble falling or staying asleep, or sleeping too much More than half the days Feeling tired or having little energy More than half the days Poor appetite, weight loss, or overeating More than half the days Feeling bad about yourself - or that you are a failure or have let yourself or your family down Several days Trouble concentrating on things such as school, work, reading, or watching TV Several days Moving or speaking so slowly that other people could have noticed; or the opposite being so fidgety that others notice Not at all Thoughts of being better off dead, or hurting yourself in some way Not at all PHQ 9 Score 12 How difficult have these problems made it for you to do your work, take care of your home and get along with others Very difficult Learning Assessment: 
No flowsheet data found. Abuse Screening: No flowsheet data found. Fall Risk No flowsheet data found. Health Maintenance reviewed and discussed and ordered per Provider. Health Maintenance Due Topic Date Due  Pneumococcal 0-64 years (1 of 1 - PPSV23) 02/24/1974  Foot Exam Q1  02/24/1978  Eye Exam Retinal or Dilated  02/24/1978  DTaP/Tdap/Td series (1 - Tdap) 02/24/1989  Shingrix Vaccine Age 50> (1 of 2) 02/24/2018  FOBT Q1Y Age 54-65  02/24/2018  Influenza Age 5 to Adult  08/01/2020  A1C test (Diabetic or Prediabetic)  08/02/2020  MICROALBUMIN Q1  08/02/2020 Nic Adan Coordination of Care: 1. Have you been to the ER, urgent care clinic since your last visit? Hospitalized since your last visit? Urgent care 2. Have you seen or consulted any other health care providers outside of the [address] [place] [address] since your last visit? Include any pap smears or colon screening.  no
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
## 2020-08-18 NOTE — TELEPHONE ENCOUNTER
Attempted to call again- he did answer. Explained to him that I have not received all lab results yet, but from what I did receive he needs to be seen urgently. 1. For critical potassium (result available in care everywhere and called from lab) with value of 2.8 - this needs IV replacement and cardiac monitoring  2. Elevated white blood cell count indicating infection, he is taking antibiotic for presumed oral infection but UTI noted as well. 3. Decreased platelets and elevated iron, increased liver function values- needs evaluation of liver    He states he will call his aid and go to ER as advised.

## 2020-08-18 NOTE — TELEPHONE ENCOUNTER
Attempted to call patient regarding critical lab results-  Called home number listed [phone]- answered by female, stated he was unavailable and didn't know when she'd see him again. Called mobile number listed [phone]. Reached voicemail- LVM indicating he needs to go to ER for urgent evaluation. Also called work number listed [phone] and reached voicemail of '[email or web address] [email or web address]'- no voicemail left. Attempted emergency contact of [phone], spoke with Virginie Jansen, who stated he was an old friend. Who gave me cell phone number of [phone], which I already tried. If patient returns call, please have him present to ER immediately with concerning lab results. His platelet level is very low- putting him at risk of bleeding. Low platelets, along with other abnormalities seen so far, including high ferritin and macrocytosis, likely due to known underlying liver disease, alcohol abuse. Not through to in basket yet, but lab called and reported critical potassium of 2.8 to [place], [address] Patient needs to go to ER for infusion of potassium as this is very low and needs replacement immediately. While not all labs have been resulted, the above is enough to warrant evaluation in emergency room for further care.

## 2020-08-18 NOTE — TELEPHONE ENCOUNTER
Jazmine called with a critical lab value on this patient-Potassium was at 2.8.   Provider was notified and will advise the patient

## 2020-08-18 NOTE — TELEPHONE ENCOUNTER
Attempted to call mobile number again, no answer. Again message left stating go to ER for evaluation.

## 2020-08-19 NOTE — TELEPHONE ENCOUNTER
Patient called to ask us to call the ER to let them know he is on the way-explained to the patient that it doesn't work that way [facility] he just needed to get to the ER and check in-patient was crying and asking for help-he wants to go to detox and get the help he needs-he states he is depressed. Not drinking this morning and not suicidal at this time. Patient is being taken by his nephew-offered to send EMS patient denied.

## 2020-09-28 NOTE — TELEPHONE ENCOUNTER
----- Message from [address] sent at 9/28/2020  2:52 PM EDT -----  Regarding: r/s ofc visit  Pls cl to r/s appt from 9/23/20

## 2020-10-30 NOTE — TELEPHONE ENCOUNTER
Requested Prescriptions     Pending Prescriptions Disp Refills    citalopram (CELEXA) 20 mg tablet 90 Tab 1     Sig: Take 1 Tab by mouth daily. *ASK PSYCH DOCTOR ABOUT THIS*    traZODone (DESYREL) 100 mg tablet 90 Tab 1     Sig: Take 1 Tab by mouth nightly. *ASK PSYCH DOCTOR ABOUT THIS*    buPROPion SR (Wellbutrin SR) 150 mg SR tablet 180 Tab 1     Sig: Take 1 Tab by mouth two (2) times a day. *ASK PSYCH DOCTOR ABOUT THIS*    lisinopriL (PRINIVIL, ZESTRIL) 40 mg tablet 90 Tab 0     Sig: Take 1 Tab by mouth daily.  *ASK HEART DOCTOR ABOUT THIS*  Indications: high blood pressure

## 2020-11-03 NOTE — TELEPHONE ENCOUNTER
Has question about 4 prescriptions that have \"ask psych doctor\" written on them. They did call and the psych doctor will not be filling them. They need to know if they are suppose to fill the medications. Please call with instructions.

## 2020-11-04 NOTE — TELEPHONE ENCOUNTER
Please see message-    Not sure what is being said-we have filled them in the past and it looks like Dr. Mable Beaverr ok'd them    Thank you

## 2020-11-06 NOTE — PROGRESS NOTES
Shira Riley presents today for Chief Complaint Patient presents with  Follow Up Chronic Condition  Knee Pain  Headache Is someone accompanying this pt? no 
 
Is the patient using any DME equipment during [address]? no 
 
Depression Screening: 
3 most recent PHQ Screens 11/6/2020 PHQ Not Done - Little interest or pleasure in doing things Several days Feeling down, depressed, irritable, or hopeless Not at all Total Score PHQ 2 1 Trouble falling or staying asleep, or sleeping too much More than half the days Feeling tired or having little energy Not at all Poor appetite, weight loss, or overeating Several days Feeling bad about yourself - or that you are a failure or have let yourself or your family down Not at all Trouble concentrating on things such as school, work, reading, or watching TV Not at all Moving or speaking so slowly that other people could have noticed; or the opposite being so fidgety that others notice Not at all Thoughts of being better off dead, or hurting yourself in some way Not at all PHQ 9 Score 4 How difficult have these problems made it for you to do your work, take care of your home and get along with others Not difficult at all Learning Assessment: 
No flowsheet data found. Abuse Screening: No flowsheet data found. Fall Risk No flowsheet data found. Health Maintenance reviewed and discussed and ordered per Provider. Health Maintenance Due Topic Date Due  Pneumococcal 0-64 years (1 of 1 - PPSV23) 02/24/1974  Eye Exam Retinal or Dilated  02/24/1978  DTaP/Tdap/Td series (1 - Tdap) 02/24/1989  Shingrix Vaccine Age 50> (1 of 2) 02/24/2018  Flu Vaccine (1) 09/01/2020 Evelyne Anglin Coordination of Care: 1. Have you been to the ER, urgent care clinic since your last visit? Hospitalized since your last visit? no 
 
2.  Have you seen or consulted any other health care providers outside of the [address] [place] [address] [place] since your last visit? Include any pap smears or colon screening.  no

## 2020-11-06 NOTE — PROGRESS NOTES
After obtaining consent, and per orders of Gerri Lowery, injection of Flulaval Quad was given by Brittney Khan. Patient instructed to remain in clinic for 20 minutes afterwards, and to report any adverse reaction to me immediately.

## 2020-11-06 NOTE — PATIENT INSTRUCTIONS
Home Blood Pressure Test: About This Test 
What is it? A home blood pressure test allows you to keep track of your blood pressure at home. Blood pressure is a measure of the force of blood against the walls of your arteries. Blood pressure readings include two numbers, such as 130/80 (say \"130 over 80\"). The first number is the systolic pressure. The second number is the diastolic pressure. Why is this test done? You may do this test at home to: · Find out if you have high blood pressure. · Track your blood pressure if you have high blood pressure. · Track how well medicine is working to reduce high blood pressure. · Check how lifestyle changes, such as weight loss and exercise, are affecting blood pressure. How do you prepare for the test? 
For at least 30 minutes before you take your blood pressure, don't exercise or use caffeine, tobacco, or medicines that raise blood pressure. Take your blood pressure while you feel comfortable and relaxed. Sit quietly with both feet on the floor for at least 5 minutes before the test. 
How is the test done? · Sit with your arm slightly bent and resting on a table so that your upper arm is at the same level as your heart. · Roll up your sleeve or take off your shirt to expose your upper arm. · Wrap the blood pressure cuff around your upper arm so that the lower edge of the cuff is about 1 inch above the bend of your elbow. Proceed with the following steps depending on if you are using an automatic or manual pressure monitor. Automatic blood pressure monitors · Press the on/off button on the automatic monitor and wait until the ready-to-measure \"heart\" symbol appears next to zero in the display window. · Press the start button. The cuff will inflate and deflate by itself. · Your blood pressure numbers will appear on the screen. · Write your numbers in your log book, along with the date and time. Manual blood pressure monitors · Place the earpieces of a stethoscope in your ears, and place the bell of the stethoscope over the artery, just below the cuff. · Close the valve on the rubber inflating bulb. · Squeeze the bulb rapidly with your opposite hand to inflate the cuff until the dial or column of mercury reads about 30 mm Hg higher than your usual systolic pressure. If you do not know your usual pressure, inflate the cuff to 210 mm Hg or until the pulse at your wrist disappears. · Open the pressure valve just slightly by twisting or pressing the valve on the bulb. · As you watch the pressure slowly fall, note the level on the dial at which you first start to hear a pulsing or tapping sound through the stethoscope. This is your systolic blood pressure. · Continue letting the air out slowly. The sounds will become muffled and will finally disappear. Note the pressure when the sounds completely disappear. This is your diastolic blood pressure. Let out all the remaining air. · Write your numbers in your log book, along with the date and time. Follow-up care is a key part of your treatment and safety. Be sure to make and go to all appointments, and call your doctor if you are having problems. It's also a good idea to keep a list of the medicines you take. Where can you learn more? Go to http://jose-justin.info/ Enter C427 in the search box to learn more about \"Home Blood Pressure Test: About This Test.\" Current as of: December 16, 2019               Content Version: 12.6 © 1966-7826 Healthwise, Incorporated. Care instructions adapted under license by [employer] (which disclaims liability or warranty for this information). If you have questions about a medical condition or this instruction, always ask your healthcare professional. [place] [address] any warranty or liability for your use of this information. Knee Pain or Injury: Care Instructions Your Care Instructions Injuries are a common cause of knee problems. Sudden (acute) injuries may be caused by a direct blow to the knee. They can also be caused by abnormal twisting, bending, or falling on the knee. Pain, bruising, or swelling may be severe, and may start within minutes of the injury. Overuse is another cause of knee pain. Other causes are climbing stairs, kneeling, and other activities that use the knee. Everyday wear and tear, especially as you get older, also can cause knee pain. Rest, along with home treatment, often relieves pain and allows your knee to heal. If you have a serious knee injury, you may need tests and treatment. Follow-up care is a key part of your treatment and safety. Be sure to make and go to all appointments, and call your doctor if you are having problems. It's also a good idea to know your test results and keep a list of the medicines you take. How can you care for yourself at home? · Be safe with medicines. Read and follow all instructions on the label. ? If the doctor gave you a prescription medicine for pain, take it as prescribed. ? If you are not taking a prescription pain medicine, ask your doctor if you can take an over-the-counter medicine. · Rest and protect your knee. Take a break from any activity that may cause pain. · Put ice or a cold pack on your knee for 10 to 20 minutes at a time. Put a thin cloth between the ice and your skin. · Prop up a sore knee on a pillow when you ice it or anytime you sit or lie down for the next 3 days. Try to keep it above the level of your heart. This will help reduce swelling. · If your knee is not swollen, you can put moist heat, a heating pad, or a warm cloth on your knee. · If your doctor recommends an elastic bandage, sleeve, or other type of support for your knee, wear it as directed. · Follow your doctor's instructions about how much weight you can put on your leg. Use a cane, crutches, or a walker as instructed. · Follow your doctor's instructions about activity during your healing process. If you can do mild exercise, slowly increase your activity. · Reach and stay at a healthy weight. Extra weight can strain the joints, especially the knees and hips, and make the pain worse. Losing even a few pounds may help. When should you call for help? Call 911 anytime you think you may need emergency care. For example, call if: 
  · You have symptoms of a blood clot in your lung (called a pulmonary embolism). These may include: 
? Sudden chest pain. ? Trouble breathing. ? Coughing up blood. Call your doctor now or seek immediate medical care if: 
  · You have severe or increasing pain.  
  · Your leg or foot turns cold or changes color.  
  · You cannot stand or put weight on your knee.  
  · Your knee looks twisted or bent out of shape.  
  · You cannot move your knee.  
  · You have signs of infection, such as: 
? Increased pain, swelling, warmth, or redness. ? Red streaks leading from the knee. ? Pus draining from a place on your knee. ? A fever.  
  · You have signs of a blood clot in your leg (called a deep vein thrombosis), such as: 
? Pain in your calf, back of the knee, thigh, or groin. ? Redness and swelling in your leg or groin. Watch closely for changes in your health, and be sure to contact your doctor if: 
  · You have tingling, weakness, or numbness in your knee.  
  · You have any new symptoms, such as swelling.  
  · You have bruises from a knee injury that last longer than 2 weeks.  
  · You do not get better as expected. Where can you learn more? Go to http://www.gray.com/ Enter K195 in the search box to learn more about \"Knee Pain or Injury: Care Instructions. \" Current as of: June 26, 2019               Content Version: 12.6 © 9730-0075 Healthwise, Incorporated.   
Care instructions adapted under license by [employer] (which disclaims liability or warranty for this information). If you have questions about a medical condition or this instruction, always ask your healthcare professional. [address] any warranty or liability for your use of this information.
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
## 2021-04-14 NOTE — TELEPHONE ENCOUNTER
Patient is requesting to speak with the provider to go over his list of medications, unclear about the medicines he received or has been prescribed. Patient seemed confused.  Please contact patient to clarify when available [phone] Comprehensive